# Patient Record
Sex: FEMALE | Race: WHITE | Employment: OTHER | URBAN - METROPOLITAN AREA
[De-identification: names, ages, dates, MRNs, and addresses within clinical notes are randomized per-mention and may not be internally consistent; named-entity substitution may affect disease eponyms.]

---

## 2017-07-03 ENCOUNTER — OFFICE VISIT (OUTPATIENT)
Dept: NEUROLOGY | Age: 67
End: 2017-07-03

## 2017-07-03 VITALS
SYSTOLIC BLOOD PRESSURE: 150 MMHG | HEIGHT: 65 IN | BODY MASS INDEX: 26.82 KG/M2 | DIASTOLIC BLOOD PRESSURE: 70 MMHG | WEIGHT: 161 LBS | RESPIRATION RATE: 20 BRPM

## 2017-07-03 DIAGNOSIS — R51.9 MIXED HEADACHE: Primary | ICD-10-CM

## 2017-07-03 RX ORDER — HYDROCODONE BITARTRATE AND ACETAMINOPHEN 7.5; 325 MG/1; MG/1
TABLET ORAL
Refills: 0 | COMMUNITY
Start: 2017-06-14 | End: 2018-09-28 | Stop reason: DRUGHIGH

## 2017-07-03 RX ORDER — ATORVASTATIN CALCIUM 10 MG/1
TABLET, FILM COATED ORAL
Refills: 3 | COMMUNITY
Start: 2017-03-31 | End: 2018-09-28 | Stop reason: ALTCHOICE

## 2017-07-03 NOTE — MR AVS SNAPSHOT
Visit Information Date & Time Provider Department Dept. Phone Encounter #  
 7/3/2017  3:40 PM Cindy Paige MD [facility] [phone] [id number] Your Appointments 7/2/2018  3:20 PM  
Follow Up with Cindy Paige MD  
[facility] [place]) Appt Note: headache [place] [address] [facility] 99 [id number] [phone]  
  
   
 [place] [address] [id number] [address] Upcoming Health Maintenance Date Due Hepatitis C Screening 1950 DTaP/Tdap/Td series (1 - Tdap) 8/30/1970 FOBT Q 1 YEAR AGE 50-75 8/30/1999 ZOSTER VACCINE AGE 60> 8/30/2009 BREAST CANCER SCRN MAMMOGRAM 2/10/2014 GLAUCOMA SCREENING Q2Y 8/30/2014 OSTEOPOROSIS SCREENING (DEXA) 8/30/2014 Pneumococcal 65+ Low/Medium Risk (1 of 2 - PCV13) 8/30/2014 MEDICARE YEARLY EXAM 8/30/2014 INFLUENZA AGE 9 TO ADULT 8/1/2017 Allergies as of 7/3/2017  Review Complete On: 5/3/2016 By: Natividad Barth MD  
  
 Severity Noted Reaction Type Reactions Hand  [Ethyl Alcohol (Skin Cleanser)]  11/03/2010    Not Reported This Time Current Immunizations  Never Reviewed No immunizations on file. Not reviewed this visit Vitals BP Resp Height(growth percentile) Weight(growth percentile) BMI OB Status 150/70 20 5' 5\" (1.651 m) 161 lb (73 kg) 26.79 kg/m2 Postmenopausal  
 Smoking Status Former Smoker Vitals History BMI and BSA Data Body Mass Index Body Surface Area  
 26.79 kg/m 2 1.83 m 2 Preferred Pharmacy Pharmacy Name Phone [facility] [address] [place] [address] [phone] Your Updated Medication List  
  
   
This list is accurate as of: 7/3/17  4:04 PM.  Always use your most recent med list. amLODIPine-benazepril 5-10 mg per capsule Commonly known as:  Mio Sill Take 1 Cap by mouth daily. atorvastatin 10 mg tablet Commonly known as:  LIPITOR  
TAKE 1 TABLET BY MOUTH ONCE A DAY  
  
 dicyclomine 20 mg tablet Commonly known as:  BENTYL  
  
 ergocalciferol 50,000 unit capsule Commonly known as:  ERGOCALCIFEROL Take 50,000 Units by mouth.  
  
 * HYDROcodone-acetaminophen 7.5-750 mg per tablet Commonly known as:  VICODIN ES Take  by mouth every six (6) hours as needed for Pain. * HYDROcodone-acetaminophen 7.5-325 mg per tablet Commonly known as:  NORCO  
TAKE 1 OR 2 TABLETS BY MOUTH 4 TIMES A DAY AS NEEDED JUBLIA Dorinda topical solution Generic drug:  efinaconazole  
  
 lamoTRIgine 150 mg tablet Commonly known as: LaMICtal  
Take 300 mg by mouth daily. metoprolol succinate 50 mg XL tablet Commonly known as:  TOPROL-XL  
daily. omeprazole 20 mg capsule Commonly known as:  PRILOSEC OxyCONTIN 30 mg Tr12 Generic drug:  oxyCODONE  
  
 PROLIA 60 mg/mL injection Generic drug:  denosumab 60 mg by SubCUTAneous route. QUEtiapine 200 mg tablet Commonly known as:  SEROquel Take 400 mg by mouth nightly. topiramate 200 mg tablet Commonly known as:  TOPAMAX TAKE ONE TABLET BY MOUTH EVERY DAY  
  
 traZODone 50 mg tablet Commonly known as:  Audie Dart Take  by mouth nightly. X-VIATE 40 % topical cream  
Generic drug:  urea * Notice: This list has 2 medication(s) that are the same as other medications prescribed for you. Read the directions carefully, and ask your doctor or other care provider to review them with you. Patient Instructions Maxine Noonan 1721 What is a living will? A living will is a legal form you use to write down the kind of care you want at the end of your life. It is used by the health professionals who will treat you if you aren't able to decide for yourself. If you put your wishes in writing, your loved ones and others will know what kind of care you want. They won't need to guess. This can ease your mind and be helpful to others. A living will is not the same as an estate or property will. An estate will explains what you want to happen with your money and property after you die. Is a living will a legal document? A living will is a legal document. Each state has its own laws about living shay. If you move to another state, make sure that your living will is legal in the state where you now live. Or you might use a universal form that has been approved by many states. This kind of form can sometimes be completed and stored online. Your electronic copy will then be available wherever you have a connection to the Internet. In most cases, doctors will respect your wishes even if you have a form from a different state. · You don't need an  to complete a living will. But legal advice can be helpful if your state's laws are unclear, your health history is complicated, or your family can't agree on what should be in your living will. · You can change your living will at any time. Some people find that their wishes about end-of-life care change as their health changes. · In addition to making a living will, think about completing a medical power of  form. This form lets you name the person you want to make end-of-life treatment decisions for you (your \"health care agent\") if you're not able to. Many hospitals and nursing homes will give you the forms you need to complete a living will and a medical power of . · Your living will is used only if you can't make or communicate decisions for yourself anymore. If you become able to make decisions again, you can accept or refuse any treatment, no matter what you wrote in your living will. · Your state may offer an online registry.  This is a place where you can store your living will online so the doctors and nurses who need to treat you can find it right away. What should you think about when creating a living will? Talk about your end-of-life wishes with your family members and your doctor. Let them know what you want. That way the people making decisions for you won't be surprised by your choices. Think about these questions as you make your living will: · Do you know enough about life support methods that might be used? If not, talk to your doctor so you know what might be done if you can't breathe on your own, your heart stops, or you're unable to swallow. · What things would you still want to be able to do after you receive life-support methods? Would you want to be able to walk? To speak? To eat on your own? To live without the help of machines? · If you have a choice, where do you want to be cared for? In your home? At a hospital or nursing home? · Do you want certain Methodist practices performed if you become very ill? · If you have a choice at the end of your life, where would you prefer to die? At home? In a hospital or nursing home? Somewhere else? · Would you prefer to be buried or cremated? · Do you want your organs to be donated after you die? What should you do with your living will? · Make sure that your family members and your health care agent have copies of your living will. · Give your doctor a copy of your living will to keep in your medical record. If you have more than one doctor, make sure that each one has a copy. · You may want to put a copy of your living will where it can be easily found. Where can you learn more? Go to http://filemon-alex.info/. Enter X429 in the search box to learn more about \"Learning About Living Christina Topete. \" Current as of: August 8, 2016 Content Version: 11.3 © 0047-9904 Bound[employer], [employer].  Care instructions adapted under license by [address] (which disclaims liability or warranty for this information). If you have questions about a medical condition or this instruction, always ask your healthcare professional. [address] any warranty or liability for your use of this information. Advance Directives: Care Instructions Your Care Instructions An advance directive is a legal way to state your wishes at the end of your life. It tells your family and your doctor what to do if you can no longer say what you want. There are two main types of advance directives. You can change them any time that your wishes change. · A living will tells your family and your doctor your wishes about life support and other treatment. · A durable power of  for health care lets you name a person to make treatment decisions for you when you can't speak for yourself. This person is called a health care agent. If you do not have an advance directive, decisions about your medical care may be made by a doctor or a  who doesn't know you. It may help to think of an advance directive as a gift to the people who care for you. If you have one, they won't have to make tough decisions by themselves. Follow-up care is a key part of your treatment and safety. Be sure to make and go to all appointments, and call your doctor if you are having problems. It's also a good idea to know your test results and keep a list of the medicines you take. How can you care for yourself at home? · Discuss your wishes with your loved ones and your doctor. This way, there are no surprises. · Many states have a unique form. Or you might use a universal form that has been approved by many states. This kind of form can sometimes be completed and stored online. Your electronic copy will then be available wherever you have a connection to the Internet.  In most cases, doctors will respect your wishes even if you have a form from a different state. · You don't need a  to do an advance directive. But you may want to get legal advice. · Think about these questions when you prepare an advance directive: ¨ Who do you want to make decisions about your medical care if you are not able to? Many people choose a family member or close friend. ¨ Do you know enough about life support methods that might be used? If not, talk to your doctor so you understand. ¨ What are you most afraid of that might happen? You might be afraid of having pain, losing your independence, or being kept alive by machines. ¨ Where would you prefer to die? Choices include your home, a hospital, or a nursing home. ¨ Would you like to have information about hospice care to support you and your family? ¨ Do you want to donate organs when you die? ¨ Do you want certain Shinto practices performed before you die? If so, put your wishes in the advance directive. · Read your advance directive every year, and make changes as needed. When should you call for help? Be sure to contact your doctor if you have any questions. Where can you learn more? Go to http://filemon-alex.info/. Enter R264 in the search box to learn more about \"Advance Directives: Care Instructions. \" Current as of: November 17, 2016 Content Version: 11.3 © 8303-7947 [employer], Inc[employer]. Care instructions adapted under license by [employer] (which disclaims liability or warranty for this information). If you have questions about a medical condition or this instruction, always ask your healthcare professional. [place] [address] any warranty or liability for your use of this information. PRESCRIPTION REFILL POLICY [facility] Statement to Patients April 1, 2014 In an effort to ensure the large volume of patient prescription refills is processed in the most efficient and expeditious manner, we are asking our patients to assist us by calling your Pharmacy for all prescription refills, this will include also your  Mail Order Pharmacy. The pharmacy will contact our office electronically to continue the refill process. Please do not wait until the last minute to call your pharmacy. We need at least 48 hours (2days) to fill prescriptions. We also encourage you to call your pharmacy before going to  your prescription to make sure it is ready. With regard to controlled substance prescription refill requests (narcotic refills) that need to be picked up at our office, we ask your cooperation by providing us with at least 72 hours (3days) notice that you will need a refill. We will not refill narcotic prescription refill requests after 4:00pm on any weekday, Monday through Thursday, or after 2:00pm on Fridays, or on the weekends. We encourage everyone to explore another way of getting your prescription refill request processed using Apptive, our patient web portal through our electronic medical record system. Apptive is an efficient and effective way to communicate your medication request directly to the office and  downloadable as an hong on your smart phone . Apptive also features a review functionality that allows you to view your medication list as well as leave messages for your physician. Are you ready to get connected? If so please review the attatched instructions or speak to any of our staff to get you set up right away! Thank you so much for your cooperation. Should you have any questions please contact our Practice Administrator. The Physicians and Staff,  [facility] Introducing [facility] SERVICES! Dear Mateus Lopez: 
Thank you for requesting a Apptive account. Our records indicate that you already have an active Apptive account.   You can access your account anytime at [email or web address] Did you know that you can access your hospital and ER discharge instructions at any time in NOTIK? You can also review all of your test results from your hospital stay or ER visit. Additional Information If you have questions, please visit the Frequently Asked Questions section of the NOTIK website at [email or web address] Remember, NOTIK is NOT to be used for urgent needs. For medical emergencies, dial 911. Now available from your iPhone and Android! Please provide this summary of care documentation to your next provider. Your primary care clinician is listed as Kg Harman. If you have any questions after today's visit, please call [phone].

## 2017-07-03 NOTE — PATIENT INSTRUCTIONS
Learning About Carmine Alejandre  What is a living will? A living will is a legal form you use to write down the kind of care you want at the end of your life. It is used by the health professionals who will treat you if you aren't able to decide for yourself. If you put your wishes in writing, your loved ones and others will know what kind of care you want. They won't need to guess. This can ease your mind and be helpful to others. A living will is not the same as an estate or property will. An estate will explains what you want to happen with your money and property after you die. Is a living will a legal document? A living will is a legal document. Each state has its own laws about living shay. If you move to another state, make sure that your living will is legal in the state where you now live. Or you might use a universal form that has been approved by many states. This kind of form can sometimes be completed and stored online. Your electronic copy will then be available wherever you have a connection to the Internet. In most cases, doctors will respect your wishes even if you have a form from a different state. · You don't need an  to complete a living will. But legal advice can be helpful if your state's laws are unclear, your health history is complicated, or your family can't agree on what should be in your living will. · You can change your living will at any time. Some people find that their wishes about end-of-life care change as their health changes. · In addition to making a living will, think about completing a medical power of  form. This form lets you name the person you want to make end-of-life treatment decisions for you (your \"health care agent\") if you're not able to. Many hospitals and nursing homes will give you the forms you need to complete a living will and a medical power of .   · Your living will is used only if you can't make or communicate decisions for yourself anymore. If you become able to make decisions again, you can accept or refuse any treatment, no matter what you wrote in your living will. · Your state may offer an online registry. This is a place where you can store your living will online so the doctors and nurses who need to treat you can find it right away. What should you think about when creating a living will? Talk about your end-of-life wishes with your family members and your doctor. Let them know what you want. That way the people making decisions for you won't be surprised by your choices. Think about these questions as you make your living will:  · Do you know enough about life support methods that might be used? If not, talk to your doctor so you know what might be done if you can't breathe on your own, your heart stops, or you're unable to swallow. · What things would you still want to be able to do after you receive life-support methods? Would you want to be able to walk? To speak? To eat on your own? To live without the help of machines? · If you have a choice, where do you want to be cared for? In your home? At a hospital or nursing home? · Do you want certain Religion practices performed if you become very ill? · If you have a choice at the end of your life, where would you prefer to die? At home? In a hospital or nursing home? Somewhere else? · Would you prefer to be buried or cremated? · Do you want your organs to be donated after you die? What should you do with your living will? · Make sure that your family members and your health care agent have copies of your living will. · Give your doctor a copy of your living will to keep in your medical record. If you have more than one doctor, make sure that each one has a copy. · You may want to put a copy of your living will where it can be easily found. Where can you learn more? Go to http://filemon-alex.info/.   Enter M851 in the search box to learn more about \"Learning About Living Deann. \"  Current as of: August 8, 2016  Content Version: 11.3  © 0388-0613 RiverOne. Care instructions adapted under license by [employer] (which disclaims liability or warranty for this information). If you have questions about a medical condition or this instruction, always ask your healthcare professional. [address] any warranty or liability for your use of this information. Advance Directives: Care Instructions  Your Care Instructions  An advance directive is a legal way to state your wishes at the end of your life. It tells your family and your doctor what to do if you can no longer say what you want. There are two main types of advance directives. You can change them any time that your wishes change. · A living will tells your family and your doctor your wishes about life support and other treatment. · A durable power of  for health care lets you name a person to make treatment decisions for you when you can't speak for yourself. This person is called a health care agent. If you do not have an advance directive, decisions about your medical care may be made by a doctor or a  who doesn't know you. It may help to think of an advance directive as a gift to the people who care for you. If you have one, they won't have to make tough decisions by themselves. Follow-up care is a key part of your treatment and safety. Be sure to make and go to all appointments, and call your doctor if you are having problems. It's also a good idea to know your test results and keep a list of the medicines you take. How can you care for yourself at home? · Discuss your wishes with your loved ones and your doctor. This way, there are no surprises. · Many states have a unique form. Or you might use a universal form that has been approved by many states. This kind of form can sometimes be completed and stored online.  Your electronic copy will then be available wherever you have a connection to the Internet. In most cases, doctors will respect your wishes even if you have a form from a different state. · You don't need a  to do an advance directive. But you may want to get legal advice. · Think about these questions when you prepare an advance directive:  ¨ Who do you want to make decisions about your medical care if you are not able to? Many people choose a family member or close friend. ¨ Do you know enough about life support methods that might be used? If not, talk to your doctor so you understand. ¨ What are you most afraid of that might happen? You might be afraid of having pain, losing your independence, or being kept alive by machines. ¨ Where would you prefer to die? Choices include your home, a hospital, or a nursing home. ¨ Would you like to have information about hospice care to support you and your family? ¨ Do you want to donate organs when you die? ¨ Do you want certain Sabianist practices performed before you die? If so, put your wishes in the advance directive. · Read your advance directive every year, and make changes as needed. When should you call for help? Be sure to contact your doctor if you have any questions. Where can you learn more? Go to http://filemon-alex.info/. Enter R264 in the search box to learn more about \"Advance Directives: Care Instructions. \"  Current as of: November 17, 2016  Content Version: 11.3  © 0347-6420 HouseTab. Care instructions adapted under license by [employer] (which disclaims liability or warranty for this information). If you have questions about a medical condition or this instruction, always ask your healthcare professional. [place] [address] any warranty or liability for your use of this information.   [address] [facility]   Statement to Patients  April 1, 2014      In an effort to ensure the large volume of patient prescription refills is processed in the most efficient and expeditious manner, we are asking our patients to assist us by calling your Pharmacy for all prescription refills, this will include also your  Mail Order Pharmacy. The pharmacy will contact our office electronically to continue the refill process. Please do not wait until the last minute to call your pharmacy. We need at least 48 hours (2days) to fill prescriptions. We also encourage you to call your pharmacy before going to  your prescription to make sure it is ready. With regard to controlled substance prescription refill requests (narcotic refills) that need to be picked up at our office, we ask your cooperation by providing us with at least 72 hours (3days) notice that you will need a refill. We will not refill narcotic prescription refill requests after 4:00pm on any weekday, Monday through Thursday, or after 2:00pm on Fridays, or on the weekends. We encourage everyone to explore another way of getting your prescription refill request processed using [employer], our patient web portal through our electronic medical record system. [employer] is an efficient and effective way to communicate your medication request directly to the office and  downloadable as an hong on your smart phone . [employer] also features a review functionality that allows you to view your medication list as well as leave messages for your physician. Are you ready to get connected? If so please review the attatched instructions or speak to any of our staff to get you set up right away! Thank you so much for your cooperation. Should you have any questions please contact our Practice Administrator. The Physicians and Staff,  [facility] [[email or web address]] [facility]     Patient is doing reasonably well recommended continuation of Topamax as a preventative.   My best advice is to stay as reasonably busy as possible and stay safe.

## 2017-08-01 RX ORDER — TOPIRAMATE 200 MG/1
TABLET ORAL
Qty: 90 TAB | Refills: 3 | Status: SHIPPED | OUTPATIENT
Start: 2017-08-01 | End: 2018-07-26 | Stop reason: SDUPTHER

## 2018-09-06 RX ORDER — TOPIRAMATE 200 MG/1
TABLET ORAL
Qty: 30 TAB | Refills: 0 | OUTPATIENT
Start: 2018-09-06

## 2018-09-06 NOTE — TELEPHONE ENCOUNTER
[facility] pharmacy called to check the status of topiramate refill. Informed her per provider no more refills until office visit. She states understanding and will let patient know.

## 2018-09-10 ENCOUNTER — TELEPHONE (OUTPATIENT)
Dept: NEUROLOGY | Age: 68
End: 2018-09-10

## 2018-09-10 RX ORDER — TOPIRAMATE 200 MG/1
TABLET ORAL
Qty: 30 TAB | Refills: 0 | Status: SHIPPED | OUTPATIENT
Start: 2018-09-10 | End: 2018-09-28 | Stop reason: SDUPTHER

## 2018-09-10 NOTE — TELEPHONE ENCOUNTER
----- Message from Kimberley Rivera sent at 9/10/2018 12:19 PM EDT -----  Regarding: Dr. Dann Martin  Pt requested a call back to schedule a sooner f/u appt if poss (out of Topamax for 3 days). Best contact [phone].

## 2018-09-28 ENCOUNTER — OFFICE VISIT (OUTPATIENT)
Dept: NEUROLOGY | Age: 68
End: 2018-09-28

## 2018-09-28 VITALS
DIASTOLIC BLOOD PRESSURE: 66 MMHG | SYSTOLIC BLOOD PRESSURE: 114 MMHG | HEART RATE: 73 BPM | BODY MASS INDEX: 28.22 KG/M2 | HEIGHT: 65 IN | OXYGEN SATURATION: 97 % | WEIGHT: 169.4 LBS | RESPIRATION RATE: 18 BRPM

## 2018-09-28 DIAGNOSIS — G43.009 MIGRAINE WITHOUT AURA AND WITHOUT STATUS MIGRAINOSUS, NOT INTRACTABLE: Primary | ICD-10-CM

## 2018-09-28 RX ORDER — HYDROCODONE BITARTRATE AND ACETAMINOPHEN 10; 325 MG/1; MG/1
2 TABLET ORAL
COMMUNITY
End: 2019-07-09

## 2018-09-28 RX ORDER — TOPIRAMATE 200 MG/1
TABLET ORAL
Qty: 90 TAB | Refills: 3 | Status: SHIPPED | OUTPATIENT
Start: 2018-09-28 | End: 2019-07-09

## 2018-09-28 NOTE — PATIENT INSTRUCTIONS
10 [facility]   Statement to Patients  April 1, 2014      In an effort to ensure the large volume of patient prescription refills is processed in the most efficient and expeditious manner, we are asking our patients to assist us by calling your Pharmacy for all prescription refills, this will include also your  Mail Order Pharmacy. The pharmacy will contact our office electronically to continue the refill process. Please do not wait until the last minute to call your pharmacy. We need at least 48 hours (2days) to fill prescriptions. We also encourage you to call your pharmacy before going to  your prescription to make sure it is ready. With regard to controlled substance prescription refill requests (narcotic refills) that need to be picked up at our office, we ask your cooperation by providing us with at least 72 hours (3days) notice that you will need a refill. We will not refill narcotic prescription refill requests after 4:00pm on any weekday, Monday through Thursday, or after 2:00pm on Fridays, or on the weekends. We encourage everyone to explore another way of getting your prescription refill request processed using Taggable, our patient web portal through our electronic medical record system. Taggable is an efficient and effective way to communicate your medication request directly to the office and  downloadable as an hong on your smart phone . Taggable also features a review functionality that allows you to view your medication list as well as leave messages for your physician. Are you ready to get connected? If so please review the attatched instructions or speak to any of our staff to get you set up right away! Thank you so much for your cooperation. Should you have any questions please contact our Practice Administrator.     The Physicians and Staff,  [address] [place] [address] Neurology Clinic                A Healthy Lifestyle: Care Instructions  Your Care Instructions    A healthy lifestyle can help you feel good, stay at a healthy weight, and have plenty of energy for both work and play. A healthy lifestyle is something you can share with your whole family. A healthy lifestyle also can lower your risk for serious health problems, such as high blood pressure, heart disease, and diabetes. You can follow a few steps listed below to improve your health and the health of your family. Follow-up care is a key part of your treatment and safety. Be sure to make and go to all appointments, and call your doctor if you are having problems. It's also a good idea to know your test results and keep a list of the medicines you take. How can you care for yourself at home? · Do not eat too much sugar, fat, or fast foods. You can still have dessert and treats now and then. The goal is moderation. · Start small to improve your eating habits. Pay attention to portion sizes, drink less juice and soda pop, and eat more fruits and vegetables. ¨ Eat a healthy amount of food. A 3-ounce serving of meat, for example, is about the size of a deck of cards. Fill the rest of your plate with vegetables and whole grains. ¨ Limit the amount of soda and sports drinks you have every day. Drink more water when you are thirsty. ¨ Eat at least 5 servings of fruits and vegetables every day. It may seem like a lot, but it is not hard to reach this goal. A serving or helping is 1 piece of fruit, 1 cup of vegetables, or 2 cups of leafy, raw vegetables. Have an apple or some carrot sticks as an afternoon snack instead of a candy bar. Try to have fruits and/or vegetables at every meal.  · Make exercise part of your daily routine. You may want to start with simple activities, such as walking, bicycling, or slow swimming. Try to be active 30 to 60 minutes every day. You do not need to do all 30 to 60 minutes all at once. For example, you can exercise 3 times a day for 10 or 20 minutes.  Moderate exercise is safe for most people, but it is always a good idea to talk to your doctor before starting an exercise program.  · Keep moving. Umang Lisa the lawn, work in the garden, or [employer]. Take the stairs instead of the elevator at work. · If you smoke, quit. People who smoke have an increased risk for heart attack, stroke, cancer, and other lung illnesses. Quitting is hard, but there are ways to boost your chance of quitting tobacco for good. ¨ Use nicotine gum, patches, or lozenges. ¨ Ask your doctor about stop-smoking programs and medicines. ¨ Keep trying. In addition to reducing your risk of diseases in the future, you will notice some benefits soon after you stop using tobacco. If you have shortness of breath or asthma symptoms, they will likely get better within a few weeks after you quit. · Limit how much alcohol you drink. Moderate amounts of alcohol (up to 2 drinks a day for men, 1 drink a day for women) are okay. But drinking too much can lead to liver problems, high blood pressure, and other health problems. Family health  If you have a family, there are many things you can do together to improve your health. · Eat meals together as a family as often as possible. · Eat healthy foods. This includes fruits, vegetables, lean meats and dairy, and whole grains. · Include your family in your fitness plan. Most people think of activities such as jogging or tennis as the way to fitness, but there are many ways you and your family can be more active. Anything that makes you breathe hard and gets your heart pumping is exercise. Here are some tips:  ¨ Walk to do errands or to take your child to school or the bus. ¨ Go for a family bike ride after dinner instead of watching TV. Where can you learn more? Go to http://filemon-alex.info/. Enter G041 in the search box to learn more about \"A Healthy Lifestyle: Care Instructions. \"  Current as of: December 7, 2017  Content Version: 11.7  © 5324-7624 Healthwise, Incorporated. Care instructions adapted under license by [employer] (which disclaims liability or warranty for this information). If you have questions about a medical condition or this instruction, always ask your healthcare professional. [address] any warranty or liability for your use of this information. Patient history reviewed and patient examined. Will renew the Topamax by request and see her back in 1 year. Hopefully all the family issues will settle and she will be better for it. Good luck.

## 2018-09-28 NOTE — PROGRESS NOTES
Neurology Consult      Subjective:      Joann Rodriguez is a 70 y.o. female who comes in today on her annual revisit. Has chronic migraine headaches and Topamax 200 mg daily does seem to help. Will renew by request and suggest revisit in 1 year. Once again has lots of family trauma unfortunately it unfolds around her and no doubt helps contribute to her headache situation. Does not mention any new issues herself and her exam looks very much baseline. Has a great attitude and seems to be there for everyone that needs a .will suggest her usual and customary revisit in 1 year. Current Outpatient Prescriptions   Medication Sig Dispense Refill    HYDROcodone-acetaminophen (NORCO)  mg tablet Take 2 Tabs by mouth five (5) times daily.  topiramate (TOPAMAX) 200 mg tablet TAKE 1 TABLET BY MOUTH EVERY DAY 90 Tab 3    dicyclomine (BENTYL) 20 mg tablet   1    lamoTRIgine (LAMICTAL) 150 mg tablet Take 300 mg by mouth daily. 1    metoprolol succinate (TOPROL-XL) 50 mg XL tablet daily. 0    omeprazole (PRILOSEC) 20 mg capsule   6    QUEtiapine (SEROQUEL) 200 mg tablet Take 400 mg by mouth nightly.  ergocalciferol (ERGOCALCIFEROL) 50,000 unit capsule Take 50,000 Units by mouth.  amLODIPine-benazepril (LOTREL) 5-10 mg per capsule Take 1 Cap by mouth daily.           Allergies   Allergen Reactions    Hand  [Ethyl Alcohol (Skin Cleanser)] Not Reported This Time     Past Medical History:   Diagnosis Date    Bipolar affective ([facility]     Chronic kidney disease     Hypertension     Ischemic colitis ([facility] 2008    Lithium toxicity 2004    Stroke [facility]) 2004      Past Surgical History:   Procedure Laterality Date    HX ORTHOPAEDIC      back surgery 2007    HX ORTHOPAEDIC      right foot surgery 2010      Social History     Social History    Marital status:      Spouse name: N/A    Number of children: N/A    Years of education: N/A     Occupational History    Not on file.     Social History Main Topics    Smoking status: Former Smoker    Smokeless tobacco: Never Used    Alcohol use No    Drug use: No    Sexual activity: Yes     Partners: Male     Other Topics Concern    Not on file     Social History Narrative      Family History   Problem Relation Age of Onset    Lung Disease Mother     Cancer Mother      Brain      Visit Vitals    /66    Pulse 73    Resp 18    Ht 5' 5\" (1.651 m)    Wt 76.8 kg (169 lb 6.4 oz)    SpO2 97%    BMI 28.19 kg/m2        Review of Systems:   A comprehensive review of systems was negative except for that written in the HPI. Neuro Exam:     Appearance: The patient is well developed, well nourished, provides a coherent history and is in no acute distress. Mental Status: Oriented to time, place and person. Mood and affect appropriate. Cranial Nerves:   Intact visual fields. Fundi are benign. GUILLE, EOM's full, no nystagmus, no ptosis. Facial sensation is normal. Corneal reflexes are intact. Facial movement is symmetric. Hearing is normal bilaterally. Palate is midline with normal sternocleidomastoid and trapezius muscles are normal. Tongue is midline. Motor:  5/5 strength in upper and lower proximal and distal muscles. Normal bulk and tone. No fasciculations. Reflexes:   Deep tendon reflexes 2+/4 and symmetrical.   Sensory:   Normal to touch, pinprick and vibration. Gait:  Normal gait. Tremor:   No tremor noted. Cerebellar:  No cerebellar signs present. Neurovascular:  Normal heart sounds and regular rhythm, peripheral pulses intact, and no carotid bruits. Assessment:   Migraine headaches. Unfortunately continues to have a lot of family drama of which she is not in control. Will renew the Topamax by request and suggest revisit in 1 year. Plan:   Revisit 1 year.   Signed by :  Pierre Stevenson MD

## 2018-09-28 NOTE — MR AVS SNAPSHOT
[address] [facility] [address] [address] [phone] Patient: Diego Bishop MRN: [id number] Visit Information Date & Time Provider Department Dept. Phone Encounter #  
 9/28/2018  3:20 PM Laxmi Anderson MD [facility] [phone] [id number] Follow-up Instructions Return in about 1 year (around 9/28/2019). Follow-up and Disposition History Upcoming Health Maintenance Date Due Hepatitis C Screening 1950 DTaP/Tdap/Td series (1 - Tdap) 8/30/1970 Shingrix Vaccine Age 50> (1 of 2) 8/30/1999 FOBT Q 1 YEAR AGE 50-75 8/30/1999 BREAST CANCER SCRN MAMMOGRAM 2/10/2014 GLAUCOMA SCREENING Q2Y 8/30/2014 Bone Densitometry (Dexa) Screening 8/30/2014 Pneumococcal 65+ Low/Medium Risk (1 of 2 - PCV13) 8/30/2014 Influenza Age 5 to Adult 8/1/2018 Allergies as of 9/28/2018  Review Complete On: 9/28/2018 By: Laxmi Anderson MD  
  
 Severity Noted Reaction Type Reactions Hand  [Ethyl Alcohol (Skin Cleanser)]  11/03/2010    Not Reported This Time Current Immunizations  Never Reviewed No immunizations on file. Not reviewed this visit You Were Diagnosed With   
  
 Codes Comments Migraine without aura and without status migrainosus, not intractable    -  Primary ICD-10-CM: G43.009 ICD-9-CM: 346.10 Vitals BP Pulse Resp Height(growth percentile) Weight(growth percentile) SpO2  
 114/66 73 18 5' 5\" (1.651 m) 169 lb 6.4 oz (76.8 kg) 97% BMI OB Status Smoking Status 28.19 kg/m2 Postmenopausal Former Smoker Vitals History BMI and BSA Data Body Mass Index Body Surface Area  
 28.19 kg/m 2 1.88 m 2 Preferred Pharmacy Pharmacy Name Phone [facility] [address] [phone] Your Updated Medication List  
  
   
 This list is accurate as of 9/28/18  4:05 PM.  Always use your most recent med list. amLODIPine-benazepril 5-10 mg per capsule Commonly known as:  Ashly Ba Take 1 Cap by mouth daily. dicyclomine 20 mg tablet Commonly known as:  BENTYL  
  
 ergocalciferol 50,000 unit capsule Commonly known as:  ERGOCALCIFEROL Take 50,000 Units by mouth. HYDROcodone-acetaminophen  mg tablet Commonly known as:  Iven Holman Take 2 Tabs by mouth five (5) times daily. lamoTRIgine 150 mg tablet Commonly known as: LaMICtal  
Take 300 mg by mouth daily. metoprolol succinate 50 mg XL tablet Commonly known as:  TOPROL-XL  
daily. omeprazole 20 mg capsule Commonly known as:  PRILOSEC QUEtiapine 200 mg tablet Commonly known as:  SEROquel Take 400 mg by mouth nightly. topiramate 200 mg tablet Commonly known as:  TOPAMAX TAKE 1 TABLET BY MOUTH EVERY DAY Prescriptions Sent to Pharmacy Refills  
 topiramate (TOPAMAX) 200 mg tablet 3 Sig: TAKE 1 TABLET BY MOUTH EVERY DAY Class: Normal  
 Pharmacy: [address] #: [phone] Follow-up Instructions Return in about 1 year (around 9/28/2019). Patient Instructions PRESCRIPTION REFILL POLICY [facility] Statement to Patients April 1, 2014 In an effort to ensure the large volume of patient prescription refills is processed in the most efficient and expeditious manner, we are asking our patients to assist us by calling your Pharmacy for all prescription refills, this will include also your  Mail Order Pharmacy. The pharmacy will contact our office electronically to continue the refill process. Please do not wait until the last minute to call your pharmacy. We need at least 48 hours (2days) to fill prescriptions.  We also encourage you to call your pharmacy before going to  your prescription to make sure it is ready. With regard to controlled substance prescription refill requests (narcotic refills) that need to be picked up at our office, we ask your cooperation by providing us with at least 72 hours (3days) notice that you will need a refill. We will not refill narcotic prescription refill requests after 4:00pm on any weekday, Monday through Thursday, or after 2:00pm on Fridays, or on the weekends. We encourage everyone to explore another way of getting your prescription refill request processed using Tagkast, our patient web portal through our electronic medical record system. Tagkast is an efficient and effective way to communicate your medication request directly to the office and  downloadable as an hong on your smart phone . Tagkast also features a review functionality that allows you to view your medication list as well as leave messages for your physician. Are you ready to get connected? If so please review the attatched instructions or speak to any of our staff to get you set up right away! Thank you so much for your cooperation. Should you have any questions please contact our Practice Administrator. The Physicians and Staff,  [address] [place] [address] Neurology Clinic A Healthy Lifestyle: Care Instructions Your Care Instructions A healthy lifestyle can help you feel good, stay at a healthy weight, and have plenty of energy for both work and play. A healthy lifestyle is something you can share with your whole family. A healthy lifestyle also can lower your risk for serious health problems, such as high blood pressure, heart disease, and diabetes. You can follow a few steps listed below to improve your health and the health of your family. Follow-up care is a key part of your treatment and safety.  Be sure to make and go to all appointments, and call your doctor if you are having problems. It's also a good idea to know your test results and keep a list of the medicines you take. How can you care for yourself at home? · Do not eat too much sugar, fat, or fast foods. You can still have dessert and treats now and then. The goal is moderation. · Start small to improve your eating habits. Pay attention to portion sizes, drink less juice and soda pop, and eat more fruits and vegetables. ¨ Eat a healthy amount of food. A 3-ounce serving of meat, for example, is about the size of a deck of cards. Fill the rest of your plate with vegetables and whole grains. ¨ Limit the amount of soda and sports drinks you have every day. Drink more water when you are thirsty. ¨ Eat at least 5 servings of fruits and vegetables every day. It may seem like a lot, but it is not hard to reach this goal. A serving or helping is 1 piece of fruit, 1 cup of vegetables, or 2 cups of leafy, raw vegetables. Have an apple or some carrot sticks as an afternoon snack instead of a candy bar. Try to have fruits and/or vegetables at every meal. 
· Make exercise part of your daily routine. You may want to start with simple activities, such as walking, bicycling, or slow swimming. Try to be active 30 to 60 minutes every day. You do not need to do all 30 to 60 minutes all at once. For example, you can exercise 3 times a day for 10 or 20 minutes. Moderate exercise is safe for most people, but it is always a good idea to talk to your doctor before starting an exercise program. 
· Keep moving. Kg Acevedo the lawn, work in the garden, or [employer]. Take the stairs instead of the elevator at work. · If you smoke, quit. People who smoke have an increased risk for heart attack, stroke, cancer, and other lung illnesses. Quitting is hard, but there are ways to boost your chance of quitting tobacco for good. ¨ Use nicotine gum, patches, or lozenges. ¨ Ask your doctor about stop-smoking programs and medicines. ¨ Keep trying. In addition to reducing your risk of diseases in the future, you will notice some benefits soon after you stop using tobacco. If you have shortness of breath or asthma symptoms, they will likely get better within a few weeks after you quit. · Limit how much alcohol you drink. Moderate amounts of alcohol (up to 2 drinks a day for men, 1 drink a day for women) are okay. But drinking too much can lead to liver problems, high blood pressure, and other health problems. Family health If you have a family, there are many things you can do together to improve your health. · Eat meals together as a family as often as possible. · Eat healthy foods. This includes fruits, vegetables, lean meats and dairy, and whole grains. · Include your family in your fitness plan. Most people think of activities such as jogging or tennis as the way to fitness, but there are many ways you and your family can be more active. Anything that makes you breathe hard and gets your heart pumping is exercise. Here are some tips: 
¨ Walk to do errands or to take your child to school or the bus. ¨ Go for a family bike ride after dinner instead of watching TV. Where can you learn more? Go to http://filemon-alex.info/. Enter F355 in the search box to learn more about \"A Healthy Lifestyle: Care Instructions. \" Current as of: December 7, 2017 Content Version: 11.7 © 3727-0850 Healthwise, Incorporated. Care instructions adapted under license by [employer] (which disclaims liability or warranty for this information). If you have questions about a medical condition or this instruction, always ask your healthcare professional. [place] [address] any warranty or liability for your use of this information. Patient history reviewed and patient examined. Will renew the Topamax by request and see her back in 1 year. Hopefully all the family issues will settle and she will be better for it. Good luck. Patient Instructions History Introducing [facility]! Dear Shalom Stage: 
Thank you for requesting a Thinglink account. Our records indicate that you already have an active Thinglink account. You can access your account anytime at [email or web address] Did you know that you can access your hospital and ER discharge instructions at any time in Thinglink? You can also review all of your test results from your hospital stay or ER visit. Additional Information If you have questions, please visit the Frequently Asked Questions section of the Thinglink website at [email or web address] Remember, Thinglink is NOT to be used for urgent needs. For medical emergencies, dial 911. Now available from your iPhone and Android! Please provide this summary of care documentation to your next provider. Your primary care clinician is listed as Tripp Will. If you have any questions after today's visit, please call [phone].

## 2019-01-18 ENCOUNTER — OFFICE VISIT (OUTPATIENT)
Dept: NEUROLOGY | Age: 69
End: 2019-01-18

## 2019-01-18 VITALS
HEIGHT: 65 IN | BODY MASS INDEX: 27.82 KG/M2 | SYSTOLIC BLOOD PRESSURE: 130 MMHG | RESPIRATION RATE: 18 BRPM | WEIGHT: 167 LBS | HEART RATE: 94 BPM | DIASTOLIC BLOOD PRESSURE: 78 MMHG | OXYGEN SATURATION: 98 %

## 2019-01-18 DIAGNOSIS — S00.83XA FACIAL CONTUSION, INITIAL ENCOUNTER: Primary | ICD-10-CM

## 2019-01-18 RX ORDER — GABAPENTIN 100 MG/1
CAPSULE ORAL
Qty: 90 CAP | Refills: 1 | Status: SHIPPED | OUTPATIENT
Start: 2019-01-18 | End: 2019-04-02 | Stop reason: SDUPTHER

## 2019-01-18 NOTE — PATIENT INSTRUCTIONS
Patient history reviewed and patient examined. Had a recent facial contusion and would like to follow-up with a head CT scan and prescribe gabapentin as needed for discomfort. Was warned it could cause sedation. We will schedule a 6-month visit which will be closer to an annual revisit date all things considered.

## 2019-01-18 NOTE — PROGRESS NOTES
Neurology Consult      Subjective:      Kelsey Gabriel is a 70 y.o. female who comes in with the following history. Said 1-1/2 weeks ago she was out at night at 8:00 rummaging through the [place] and as she stepped back and turned to her left she apparently tripped up on her feet and fell forward but was not in a position to break her fall. Ended up hitting her central face on the ground and was out and she does not recall how long that was. Came to and apparently asked for help and a neighbor showed up and suggested possible 911 call. Ended up not going to the ER or getting medical assistance. Said she had some black and blue discoloration on her central forehead and still retains pain. Normally sees me for a mixed headache picture that includes migraines as well. Sees me normally on annual basis and was technically last seen here in September. From me is on 200 mg a day of Topamax. Would have a follow-up visit in September of this year but will bridge the gap with a 6-month revisit from now. Will order a head CT on this visit respecting the new trauma history and hopefully we will not find any abnormal pathology. Current Outpatient Medications   Medication Sig Dispense Refill    gabapentin (NEURONTIN) 100 mg capsule 1 po tid prn only. .. 90 Cap 1    HYDROcodone-acetaminophen (NORCO)  mg tablet Take 2 Tabs by mouth five (5) times daily.  topiramate (TOPAMAX) 200 mg tablet TAKE 1 TABLET BY MOUTH EVERY DAY 90 Tab 3    dicyclomine (BENTYL) 20 mg tablet   1    lamoTRIgine (LAMICTAL) 150 mg tablet Take 300 mg by mouth daily. 1    metoprolol succinate (TOPROL-XL) 50 mg XL tablet daily. 0    omeprazole (PRILOSEC) 20 mg capsule   6    QUEtiapine (SEROQUEL) 200 mg tablet Take 400 mg by mouth nightly.  ergocalciferol (ERGOCALCIFEROL) 50,000 unit capsule Take 50,000 Units by mouth.  amLODIPine-benazepril (LOTREL) 5-10 mg per capsule Take 1 Cap by mouth daily.           Allergies Allergen Reactions    Hand  [Ethyl Alcohol (Skin Cleanser)] Not Reported This Time     Past Medical History:   Diagnosis Date    Bipolar affective ([facility] [address]     Chronic kidney disease     Hypertension     Ischemic colitis ([facility] [address] 2008    Lithium toxicity 2004    Stroke [facility]) 2004      Past Surgical History:   Procedure Laterality Date    HX ORTHOPAEDIC      back surgery 2007    HX ORTHOPAEDIC      right foot surgery 2010      Social History     Socioeconomic History    Marital status:      Spouse name: Not on file    Number of children: Not on file    Years of education: Not on file    Highest education level: Not on file   Social Needs    Financial resource strain: Not on file    Food insecurity - worry: Not on file    Food insecurity - inability: Not on file   [employer] needs - medical: Not on file   [employer] needs - non-medical: Not on file   Occupational History    Not on file   Tobacco Use    Smoking status: Former Smoker    Smokeless tobacco: Never Used   Substance and Sexual Activity    Alcohol use: No     Alcohol/week: 0.0 oz    Drug use: No    Sexual activity: Yes     Partners: Male   Other Topics Concern    Not on file   Social History Narrative    Not on file      Family History   Problem Relation Age of Onset    Lung Disease Mother     Cancer Mother         Brain      Visit Vitals  /78   Pulse 94   Resp 18   Ht 5' 5\" (1.651 m)   Wt 75.8 kg (167 lb)   SpO2 98%   BMI 27.79 kg/m²        Review of Systems:   A comprehensive review of systems was negative except for that written in the HPI. Neuro Exam:     Appearance: The patient is well developed, well nourished, provides a coherent history and is in no acute distress. Mental Status: Oriented to time, place and person. Mood and affect appropriate. Cranial Nerves:   Intact visual fields. Fundi are benign. GUILLE, EOM's full, no nystagmus, no ptosis.  Facial sensation is normal. Corneal reflexes are intact. Facial movement is symmetric. Hearing is normal bilaterally. Palate is midline with normal sternocleidomastoid and trapezius muscles are normal. Tongue is midline. Motor:  5/5 strength in upper and lower proximal and distal muscles. Normal bulk and tone. No fasciculations. Reflexes:   Deep tendon reflexes 2+/4 and symmetrical.   Sensory:   Normal to touch, pinprick and vibration. Gait:  Normal gait. Tremor:   No tremor noted. Cerebellar:  No cerebellar signs present. Neurovascular:  Normal heart sounds and regular rhythm, peripheral pulses intact, and no carotid bruits. Assessment:   Central facial contusion. Still retains pain and will do a head CT just to be on the safe side. Issue gabapentin 100 mg 3 times daily as needed and she was warned of possible sedation. Will suggest revisit in 6 months and that will put her closer to her normal and customary 1 year revisit timeframe for mixed headache picture. Plan:   Revisit 6 months.   Signed by :  Aaliyah Potts MD

## 2019-01-28 ENCOUNTER — HOSPITAL ENCOUNTER (OUTPATIENT)
Dept: CT IMAGING | Age: 69
Discharge: HOME | End: 2019-01-28
Attending: SPECIALIST
Payer: COMMERCIAL

## 2019-01-28 DIAGNOSIS — S00.83XA FACIAL CONTUSION, INITIAL ENCOUNTER: ICD-10-CM

## 2019-01-28 PROCEDURE — 70450 CT HEAD/BRAIN W/O DYE: CPT

## 2019-02-14 ENCOUNTER — TELEPHONE (OUTPATIENT)
Dept: NEUROLOGY | Age: 69
End: 2019-02-14

## 2019-02-14 NOTE — TELEPHONE ENCOUNTER
----- Message from Delorise Scriver sent at 2/14/2019  1:39 PM EST -----  Regarding: Dr Post/Telephone  Pt is following up on results of CT Scan done 01/18/19    Best contact #  [phone]

## 2019-02-19 ENCOUNTER — TELEPHONE (OUTPATIENT)
Dept: NEUROLOGY | Age: 69
End: 2019-02-19

## 2019-02-19 NOTE — TELEPHONE ENCOUNTER
----- Message from Popeye Prasad sent at 2/19/2019 12:32 PM EST -----  Regarding: Dr. Roni Lacey  Pt returning call regarding results. [phone].

## 2019-02-20 NOTE — TELEPHONE ENCOUNTER
----- Message from Marky Combs sent at 2/20/2019  2:49 PM EST -----  Regarding: Dr. Segura Mom  Contact: [phone]  Pt missed call from Brenda and would like a call back.

## 2019-04-02 RX ORDER — GABAPENTIN 100 MG/1
CAPSULE ORAL
Qty: 90 CAP | Refills: 1 | Status: SHIPPED | OUTPATIENT
Start: 2019-04-02 | End: 2019-06-06 | Stop reason: SDUPTHER

## 2019-06-06 RX ORDER — GABAPENTIN 100 MG/1
CAPSULE ORAL
Qty: 90 CAP | Refills: 1 | Status: SHIPPED | OUTPATIENT
Start: 2019-06-06 | End: 2019-07-12 | Stop reason: SDUPTHER

## 2019-07-09 ENCOUNTER — APPOINTMENT (OUTPATIENT)
Dept: CT IMAGING | Age: 69
End: 2019-07-09
Attending: EMERGENCY MEDICINE
Payer: COMMERCIAL

## 2019-07-09 ENCOUNTER — APPOINTMENT (OUTPATIENT)
Dept: GENERAL RADIOLOGY | Age: 69
End: 2019-07-09
Attending: EMERGENCY MEDICINE
Payer: COMMERCIAL

## 2019-07-09 ENCOUNTER — HOSPITAL ENCOUNTER (OUTPATIENT)
Age: 69
Setting detail: OBSERVATION
Discharge: HOME | End: 2019-07-10
Attending: EMERGENCY MEDICINE | Admitting: HOSPITALIST
Payer: COMMERCIAL

## 2019-07-09 DIAGNOSIS — R00.0 SINUS TACHYCARDIA: ICD-10-CM

## 2019-07-09 DIAGNOSIS — M79.10 MYALGIA: ICD-10-CM

## 2019-07-09 DIAGNOSIS — R41.82 ALTERED MENTAL STATUS, UNSPECIFIED ALTERED MENTAL STATUS TYPE: Primary | ICD-10-CM

## 2019-07-09 DIAGNOSIS — N28.9 RENAL INSUFFICIENCY: ICD-10-CM

## 2019-07-09 PROBLEM — G93.40 ACUTE ENCEPHALOPATHY: Status: ACTIVE | Noted: 2019-07-09

## 2019-07-09 PROBLEM — I10 HTN (HYPERTENSION): Status: ACTIVE | Noted: 2019-07-09

## 2019-07-09 PROBLEM — E87.1 HYPONATREMIA: Status: ACTIVE | Noted: 2019-07-09

## 2019-07-09 PROBLEM — G93.40 ENCEPHALOPATHY: Status: ACTIVE | Noted: 2019-07-09

## 2019-07-09 PROBLEM — N17.9 ARF (ACUTE RENAL FAILURE) (HCC): Status: ACTIVE | Noted: 2019-07-09

## 2019-07-09 LAB
ABO + RH BLD: NORMAL
ALBUMIN SERPL-MCNC: 3.8 G/DL (ref 3.5–5)
ALBUMIN/GLOB SERPL: 0.8 {RATIO} (ref 1.1–2.2)
ALP SERPL-CCNC: 89 U/L (ref 45–117)
ALT SERPL-CCNC: 18 U/L (ref 12–78)
AMMONIA PLAS-SCNC: 32 UMOL/L
AMPHET UR QL SCN: NEGATIVE
ANION GAP SERPL CALC-SCNC: 10 MMOL/L (ref 5–15)
APPEARANCE UR: ABNORMAL
AST SERPL-CCNC: 21 U/L (ref 15–37)
ATRIAL RATE: 112 BPM
BACTERIA URNS QL MICRO: NEGATIVE /HPF
BARBITURATES UR QL SCN: NEGATIVE
BASOPHILS # BLD: 0.1 K/UL (ref 0–0.1)
BASOPHILS NFR BLD: 1 % (ref 0–1)
BENZODIAZ UR QL: NEGATIVE
BILIRUB SERPL-MCNC: 0.5 MG/DL (ref 0.2–1)
BILIRUB UR QL CFM: ABNORMAL
BLOOD GROUP ANTIBODIES SERPL: NORMAL
BUN SERPL-MCNC: 17 MG/DL (ref 6–20)
BUN/CREAT SERPL: 10 (ref 12–20)
CALCIUM SERPL-MCNC: 9.8 MG/DL (ref 8.5–10.1)
CALCULATED P AXIS, ECG09: 52 DEGREES
CALCULATED R AXIS, ECG10: 18 DEGREES
CALCULATED T AXIS, ECG11: 18 DEGREES
CANNABINOIDS UR QL SCN: NEGATIVE
CHLORIDE SERPL-SCNC: 101 MMOL/L (ref 97–108)
CK SERPL-CCNC: 534 U/L (ref 26–192)
CO2 SERPL-SCNC: 21 MMOL/L (ref 21–32)
COCAINE UR QL SCN: NEGATIVE
COLOR UR: ABNORMAL
COMMENT, HOLDF: NORMAL
CREAT SERPL-MCNC: 1.65 MG/DL (ref 0.55–1.02)
DIAGNOSIS, 93000: NORMAL
DIFFERENTIAL METHOD BLD: ABNORMAL
DRUG SCRN COMMENT,DRGCM: ABNORMAL
EOSINOPHIL # BLD: 0.1 K/UL (ref 0–0.4)
EOSINOPHIL NFR BLD: 1 % (ref 0–7)
EPITH CASTS URNS QL MICRO: NORMAL /LPF
ERYTHROCYTE [DISTWIDTH] IN BLOOD BY AUTOMATED COUNT: 12.8 % (ref 11.5–14.5)
ETHANOL SERPL-MCNC: <10 MG/DL
GLOBULIN SER CALC-MCNC: 4.6 G/DL (ref 2–4)
GLUCOSE SERPL-MCNC: 116 MG/DL (ref 65–100)
GLUCOSE UR STRIP.AUTO-MCNC: NEGATIVE MG/DL
HCT VFR BLD AUTO: 31 % (ref 35–47)
HGB BLD-MCNC: 10 G/DL (ref 11.5–16)
HGB UR QL STRIP: ABNORMAL
IMM GRANULOCYTES # BLD AUTO: 0 K/UL (ref 0–0.04)
IMM GRANULOCYTES NFR BLD AUTO: 0 % (ref 0–0.5)
INR PPP: 1 (ref 0.9–1.1)
KETONES UR QL STRIP.AUTO: NEGATIVE MG/DL
LACTATE SERPL-SCNC: 0.9 MMOL/L (ref 0.4–2)
LEUKOCYTE ESTERASE UR QL STRIP.AUTO: ABNORMAL
LYMPHOCYTES # BLD: 2 K/UL (ref 0.8–3.5)
LYMPHOCYTES NFR BLD: 21 % (ref 12–49)
MCH RBC QN AUTO: 29.9 PG (ref 26–34)
MCHC RBC AUTO-ENTMCNC: 32.3 G/DL (ref 30–36.5)
MCV RBC AUTO: 92.8 FL (ref 80–99)
METHADONE UR QL: NEGATIVE
MONOCYTES # BLD: 1.3 K/UL (ref 0–1)
MONOCYTES NFR BLD: 13 % (ref 5–13)
NEUTS SEG # BLD: 6.3 K/UL (ref 1.8–8)
NEUTS SEG NFR BLD: 64 % (ref 32–75)
NITRITE UR QL STRIP.AUTO: NEGATIVE
NRBC # BLD: 0 K/UL (ref 0–0.01)
NRBC BLD-RTO: 0 PER 100 WBC
OPIATES UR QL: POSITIVE
P-R INTERVAL, ECG05: 194 MS
PCP UR QL: NEGATIVE
PH UR STRIP: 5.5 [PH] (ref 5–8)
PLATELET # BLD AUTO: 316 K/UL (ref 150–400)
PMV BLD AUTO: 9.7 FL (ref 8.9–12.9)
POTASSIUM SERPL-SCNC: 3.9 MMOL/L (ref 3.5–5.1)
PROT SERPL-MCNC: 8.4 G/DL (ref 6.4–8.2)
PROT UR STRIP-MCNC: ABNORMAL MG/DL
PROTHROMBIN TIME: 10.2 SEC (ref 9–11.1)
Q-T INTERVAL, ECG07: 310 MS
QRS DURATION, ECG06: 88 MS
QTC CALCULATION (BEZET), ECG08: 423 MS
RBC # BLD AUTO: 3.34 M/UL (ref 3.8–5.2)
RBC #/AREA URNS HPF: NORMAL /HPF (ref 0–5)
SAMPLES BEING HELD,HOLD: NORMAL
SODIUM SERPL-SCNC: 132 MMOL/L (ref 136–145)
SP GR UR REFRACTOMETRY: 1.02 (ref 1–1.03)
SPECIMEN EXP DATE BLD: NORMAL
TSH SERPL DL<=0.05 MIU/L-ACNC: 1.4 UIU/ML (ref 0.36–3.74)
UR CULT HOLD, URHOLD: NORMAL
UROBILINOGEN UR QL STRIP.AUTO: 0.2 EU/DL (ref 0.2–1)
VENTRICULAR RATE, ECG03: 112 BPM
WBC # BLD AUTO: 9.8 K/UL (ref 3.6–11)
WBC URNS QL MICRO: NORMAL /HPF (ref 0–4)

## 2019-07-09 PROCEDURE — 99218 HC RM OBSERVATION: CPT

## 2019-07-09 PROCEDURE — 83605 ASSAY OF LACTIC ACID: CPT

## 2019-07-09 PROCEDURE — 65270000029 HC RM PRIVATE

## 2019-07-09 PROCEDURE — 74011250637 HC RX REV CODE- 250/637: Performed by: INTERNAL MEDICINE

## 2019-07-09 PROCEDURE — 80053 COMPREHEN METABOLIC PANEL: CPT

## 2019-07-09 PROCEDURE — 80307 DRUG TEST PRSMV CHEM ANLYZR: CPT

## 2019-07-09 PROCEDURE — 84443 ASSAY THYROID STIM HORMONE: CPT

## 2019-07-09 PROCEDURE — 82140 ASSAY OF AMMONIA: CPT

## 2019-07-09 PROCEDURE — 81001 URINALYSIS AUTO W/SCOPE: CPT

## 2019-07-09 PROCEDURE — 93005 ELECTROCARDIOGRAM TRACING: CPT

## 2019-07-09 PROCEDURE — 70450 CT HEAD/BRAIN W/O DYE: CPT

## 2019-07-09 PROCEDURE — 85025 COMPLETE CBC W/AUTO DIFF WBC: CPT

## 2019-07-09 PROCEDURE — 86900 BLOOD TYPING SEROLOGIC ABO: CPT

## 2019-07-09 PROCEDURE — 96361 HYDRATE IV INFUSION ADD-ON: CPT

## 2019-07-09 PROCEDURE — 82550 ASSAY OF CK (CPK): CPT

## 2019-07-09 PROCEDURE — 85610 PROTHROMBIN TIME: CPT

## 2019-07-09 PROCEDURE — 96372 THER/PROPH/DIAG INJ SC/IM: CPT

## 2019-07-09 PROCEDURE — 71046 X-RAY EXAM CHEST 2 VIEWS: CPT

## 2019-07-09 PROCEDURE — 65390000012 HC CONDITION CODE 44 OBSERVATION

## 2019-07-09 PROCEDURE — 96360 HYDRATION IV INFUSION INIT: CPT

## 2019-07-09 PROCEDURE — 72125 CT NECK SPINE W/O DYE: CPT

## 2019-07-09 PROCEDURE — 87040 BLOOD CULTURE FOR BACTERIA: CPT

## 2019-07-09 PROCEDURE — 74011250636 HC RX REV CODE- 250/636: Performed by: INTERNAL MEDICINE

## 2019-07-09 PROCEDURE — 36415 COLL VENOUS BLD VENIPUNCTURE: CPT

## 2019-07-09 PROCEDURE — 99284 EMERGENCY DEPT VISIT MOD MDM: CPT

## 2019-07-09 PROCEDURE — 74011250636 HC RX REV CODE- 250/636: Performed by: EMERGENCY MEDICINE

## 2019-07-09 RX ORDER — OXYCODONE HYDROCHLORIDE 5 MG/1
20 TABLET ORAL DAILY
Status: DISCONTINUED | OUTPATIENT
Start: 2019-07-10 | End: 2019-07-10 | Stop reason: HOSPADM

## 2019-07-09 RX ORDER — LAMOTRIGINE 100 MG/1
300 TABLET ORAL
Status: DISCONTINUED | OUTPATIENT
Start: 2019-07-09 | End: 2019-07-10 | Stop reason: HOSPADM

## 2019-07-09 RX ORDER — OXYCODONE HYDROCHLORIDE 5 MG/1
20 TABLET ORAL EVERY EVENING
Status: DISCONTINUED | OUTPATIENT
Start: 2019-07-09 | End: 2019-07-10 | Stop reason: HOSPADM

## 2019-07-09 RX ORDER — OXYCODONE HYDROCHLORIDE 10 MG/1
20 TABLET ORAL EVERY EVENING
COMMUNITY
End: 2022-09-12 | Stop reason: SDUPTHER

## 2019-07-09 RX ORDER — OXYCODONE HYDROCHLORIDE 10 MG/1
10 TABLET ORAL
COMMUNITY

## 2019-07-09 RX ORDER — OXYCODONE HYDROCHLORIDE 5 MG/1
10 TABLET ORAL
Status: DISCONTINUED | OUTPATIENT
Start: 2019-07-09 | End: 2019-07-10 | Stop reason: HOSPADM

## 2019-07-09 RX ORDER — HEPARIN SODIUM 5000 [USP'U]/ML
5000 INJECTION, SOLUTION INTRAVENOUS; SUBCUTANEOUS EVERY 8 HOURS
Status: DISCONTINUED | OUTPATIENT
Start: 2019-07-09 | End: 2019-07-10 | Stop reason: HOSPADM

## 2019-07-09 RX ORDER — ACETAMINOPHEN 500 MG
500 TABLET ORAL DAILY
Status: DISCONTINUED | OUTPATIENT
Start: 2019-07-10 | End: 2019-07-10 | Stop reason: HOSPADM

## 2019-07-09 RX ORDER — TOPIRAMATE 100 MG/1
200 TABLET, FILM COATED ORAL
Status: DISCONTINUED | OUTPATIENT
Start: 2019-07-09 | End: 2019-07-10 | Stop reason: HOSPADM

## 2019-07-09 RX ORDER — ACETAMINOPHEN 500 MG
500 TABLET ORAL DAILY
COMMUNITY

## 2019-07-09 RX ORDER — SODIUM CHLORIDE 9 MG/ML
125 INJECTION, SOLUTION INTRAVENOUS CONTINUOUS
Status: DISCONTINUED | OUTPATIENT
Start: 2019-07-09 | End: 2019-07-10

## 2019-07-09 RX ORDER — SODIUM CHLORIDE 0.9 % (FLUSH) 0.9 %
5-40 SYRINGE (ML) INJECTION AS NEEDED
Status: DISCONTINUED | OUTPATIENT
Start: 2019-07-09 | End: 2019-07-10 | Stop reason: HOSPADM

## 2019-07-09 RX ORDER — SODIUM CHLORIDE 0.9 % (FLUSH) 0.9 %
5-40 SYRINGE (ML) INJECTION EVERY 8 HOURS
Status: DISCONTINUED | OUTPATIENT
Start: 2019-07-09 | End: 2019-07-10 | Stop reason: HOSPADM

## 2019-07-09 RX ORDER — ACETAMINOPHEN 500 MG
500 TABLET ORAL EVERY EVENING
COMMUNITY

## 2019-07-09 RX ORDER — TOPIRAMATE 200 MG/1
200 TABLET ORAL
COMMUNITY
End: 2020-09-24 | Stop reason: SDUPTHER

## 2019-07-09 RX ORDER — OXYCODONE HYDROCHLORIDE 10 MG/1
20 TABLET ORAL DAILY
COMMUNITY
End: 2022-09-12 | Stop reason: SDUPTHER

## 2019-07-09 RX ORDER — METOPROLOL SUCCINATE 50 MG/1
50 TABLET, EXTENDED RELEASE ORAL DAILY
Status: DISCONTINUED | OUTPATIENT
Start: 2019-07-10 | End: 2019-07-10 | Stop reason: HOSPADM

## 2019-07-09 RX ORDER — PANTOPRAZOLE SODIUM 40 MG/1
40 TABLET, DELAYED RELEASE ORAL
Status: DISCONTINUED | OUTPATIENT
Start: 2019-07-10 | End: 2019-07-10 | Stop reason: HOSPADM

## 2019-07-09 RX ORDER — OXYCODONE HYDROCHLORIDE 5 MG/1
10 TABLET ORAL
Status: DISCONTINUED | OUTPATIENT
Start: 2019-07-09 | End: 2019-07-09

## 2019-07-09 RX ORDER — GABAPENTIN 100 MG/1
100 CAPSULE ORAL 3 TIMES DAILY
Status: DISCONTINUED | OUTPATIENT
Start: 2019-07-09 | End: 2019-07-10 | Stop reason: HOSPADM

## 2019-07-09 RX ORDER — QUETIAPINE FUMARATE 100 MG/1
400 TABLET, FILM COATED ORAL
Status: DISCONTINUED | OUTPATIENT
Start: 2019-07-09 | End: 2019-07-10 | Stop reason: HOSPADM

## 2019-07-09 RX ADMIN — LAMOTRIGINE 300 MG: 100 TABLET ORAL at 21:53

## 2019-07-09 RX ADMIN — OXYCODONE HYDROCHLORIDE 20 MG: 5 TABLET ORAL at 19:02

## 2019-07-09 RX ADMIN — OXYCODONE HYDROCHLORIDE 10 MG: 5 TABLET ORAL at 23:28

## 2019-07-09 RX ADMIN — HEPARIN SODIUM 5000 UNITS: 5000 INJECTION INTRAVENOUS; SUBCUTANEOUS at 21:53

## 2019-07-09 RX ADMIN — SODIUM CHLORIDE 1000 ML: 900 INJECTION, SOLUTION INTRAVENOUS at 16:04

## 2019-07-09 RX ADMIN — Medication 10 ML: at 21:56

## 2019-07-09 RX ADMIN — TOPIRAMATE 200 MG: 100 TABLET, FILM COATED ORAL at 21:53

## 2019-07-09 RX ADMIN — QUETIAPINE FUMARATE 400 MG: 100 TABLET ORAL at 21:53

## 2019-07-09 RX ADMIN — SODIUM CHLORIDE 125 ML/HR: 900 INJECTION, SOLUTION INTRAVENOUS at 17:42

## 2019-07-09 RX ADMIN — GABAPENTIN 100 MG: 100 CAPSULE ORAL at 21:53

## 2019-07-09 NOTE — ED NOTES
Patient Throughput:  Charge nurse on 5th floor made aware of patient's room assignment, room [place] Raul RN  Shift Resource Nurse  Emergency Department

## 2019-07-09 NOTE — ED NOTES
TRANSFER - OUT REPORT:    Verbal report given to LEROY Reynoso(name) on Fadi Shipley  being transferred to [facility](unit) for routine progression of care       Report consisted of patients Situation, Background, Assessment and   Recommendations(SBAR). Information from the following report(s) SBAR, Kardex, ED Summary and MAR was reviewed with the receiving nurse. Lines:   Peripheral IV 07/09/19 Left Antecubital (Active)   Site Assessment Clean, dry, & intact 7/9/2019  1:28 PM   Phlebitis Assessment 0 7/9/2019  1:28 PM   Infiltration Assessment 0 7/9/2019  1:28 PM   Dressing Status Clean, dry, & intact 7/9/2019  1:28 PM   Dressing Type Tape;Transparent 7/9/2019  1:28 PM   Hub Color/Line Status Pink;Flushed;Patent 7/9/2019  1:28 PM   Action Taken Blood drawn 7/9/2019  1:28 PM       Peripheral IV 07/09/19 Right Forearm (Active)   Site Assessment Clean, dry, & intact 7/9/2019  1:29 PM   Phlebitis Assessment 0 7/9/2019  1:29 PM   Infiltration Assessment 0 7/9/2019  1:29 PM   Dressing Status Clean, dry, & intact 7/9/2019  1:29 PM   Dressing Type Tape;Transparent 7/9/2019  1:29 PM   Hub Color/Line Status Pink;Flushed;Patent 7/9/2019  1:29 PM   Action Taken Blood drawn 7/9/2019  1:29 PM        Opportunity for questions and clarification was provided.       Patient transported with:   [employer]

## 2019-07-09 NOTE — ED TRIAGE NOTES
Pt here for confusion and poor memory starting Friday.  reports she has been constantly repeating herself which is observed during triage. Pt also complain of fibromyalgia pain that has flared up in shoulders and right lower back.

## 2019-07-09 NOTE — PROGRESS NOTES
[facility]: MED RECONCILIATION    Information obtained from: Patient, her  and medication list     Allergies: Hand  [ethyl alcohol (skin cleanser)]    Prior to Admission Medications:     Medication Documentation Review Audit       Reviewed by Mac Irene PHARMD (Pharmacist) on 07/09/19 at 1656      Medication Sig Documenting Provider Last Dose Status Taking?   acetaminophen (TYLENOL EXTRA STRENGTH) 500 mg tablet Take 500 mg by mouth daily. Provider, Historical 7/9/2019 AM Active Yes   acetaminophen (TYLENOL EXTRA STRENGTH) 500 mg tablet Take 500 mg by mouth every evening. Provider, Historical 7/8/2019 Active Yes   amLODIPine-benazepril (LOTREL) 5-10 mg per capsule Take 1 Cap by mouth daily. Justin Colón MD 7/9/2019 AM Active Yes   dicyclomine (BENTYL) 20 mg tablet Take 20 mg by mouth four (4) times daily. Provider, Historical 7/9/2019 AM Active Yes              ergocalciferol (ERGOCALCIFEROL) 50,000 unit capsule Take 50,000 Units by mouth every Monday. Provider, Historical 7/8/2019 Active Yes   gabapentin (NEURONTIN) 100 mg capsule TAKE ONE CAPSULE BY MOUTH 3 TIMES A DAY AS NEEDED Ryanne Kamara MD  Active Yes   lamoTRIgine (LAMICTAL) 150 mg tablet Take 300 mg by mouth nightly. Provider, Historical 7/8/2019 Active Yes              metoprolol succinate (TOPROL-XL) 50 mg XL tablet Take 50 mg by mouth daily. Provider, Historical 7/9/2019 AM Active Yes              omeprazole (PRILOSEC) 20 mg capsule Take 20 mg by mouth daily. Provider, Historical 7/9/2019 AM Active Yes              oxyCODONE IR (ROXICODONE) 10 mg tab immediate release tablet Take 20 mg by mouth daily. Provider, Historical 7/9/2019 AM Active Yes   oxyCODONE IR (ROXICODONE) 10 mg tab immediate release tablet Take 10 mg by mouth daily as needed (Midday breakthrough pain). Provider, Historical  Active Yes   oxyCODONE IR (ROXICODONE) 10 mg tab immediate release tablet Take 20 mg by mouth every evening.  Provider, Historical 7/8/2019 Active Yes   QUEtiapine (SEROQUEL) 200 mg tablet Take 400 mg by mouth nightly. Provider, Historical 7/8/2019 Active Yes   topiramate (TOPAMAX) 200 mg tablet Take 200 mg by mouth nightly.  Provider, Historical 7/8/2019 Active Yes                        [address] [facility], PHARMD   Contact: [phone]

## 2019-07-09 NOTE — H&P
[address] [facility]  [address], [place] [address]  [phone]    Admission History and Physical      NAME:  Deana West   :   [id number]   MRN:  [id number]     PCP:  Caprice Marino MD     Date/Time:  2019         Subjective:     CHIEF COMPLAINT: confusion, pain      HISTORY OF PRESENT ILLNESS:     Ms. Ramiro Christopher is a 70 y.o.  female who is admitted with acute encephalopathy. Ms. Ramiro Christopher with PMH of bipolar disorder, HTN presented to ER c/o generalized body ache and confusion. According to her , pt has been having worsening generalized body pain for about 4 days. Recently, she became confuse and incoherent, but denies weakness. Pt is \"not making sense when she is talking\". In ER, her mentation has improved and pt could be able to provide history. Denies diarrhea or fever. Past Medical History:   Diagnosis Date    Bipolar affective ([facility] [address]     Chronic kidney disease     Hypertension     Ischemic colitis ([facility] [address] 2008    Lithium toxicity 2004    Stroke [facility])         Past Surgical History:   Procedure Laterality Date    HX ORTHOPAEDIC      back surgery 2007    HX ORTHOPAEDIC      right foot surgery        Social History     Tobacco Use    Smoking status: Former Smoker    Smokeless tobacco: Never Used   Substance Use Topics    Alcohol use: No     Alcohol/week: 0.0 oz        Family History   Problem Relation Age of Onset    Lung Disease Mother     Cancer Mother         Brain        Allergies   Allergen Reactions    Hand  [Ethyl Alcohol (Skin Cleanser)] Not Reported This Time        Prior to Admission medications    Medication Sig Start Date End Date Taking? Authorizing Provider   topiramate (TOPAMAX) 200 mg tablet Take 200 mg by mouth nightly. Yes Provider, Historical   oxyCODONE IR (ROXICODONE) 10 mg tab immediate release tablet Take 20 mg by mouth daily.    Yes Provider, Historical   oxyCODONE IR (ROXICODONE) 10 mg tab immediate release tablet Take 10 mg by mouth daily as needed (Midday breakthrough pain). Yes Provider, Historical   acetaminophen (TYLENOL EXTRA STRENGTH) 500 mg tablet Take 500 mg by mouth daily. Yes Provider, Historical   acetaminophen (TYLENOL EXTRA STRENGTH) 500 mg tablet Take 500 mg by mouth every evening. Yes Provider, Historical   oxyCODONE IR (ROXICODONE) 10 mg tab immediate release tablet Take 20 mg by mouth every evening. Yes Provider, Historical   gabapentin (NEURONTIN) 100 mg capsule TAKE ONE CAPSULE BY MOUTH 3 TIMES A DAY AS NEEDED 6/6/19  Yes Nadeen Baugh MD   dicyclomine (BENTYL) 20 mg tablet Take 20 mg by mouth four (4) times daily. 6/30/15  Yes Provider, Historical   lamoTRIgine (LAMICTAL) 150 mg tablet Take 300 mg by mouth nightly. 6/30/15  Yes Provider, Historical   metoprolol succinate (TOPROL-XL) 50 mg XL tablet Take 50 mg by mouth daily. 6/15/15  Yes Provider, Historical   omeprazole (PRILOSEC) 20 mg capsule Take 20 mg by mouth daily. 6/3/15  Yes Provider, Historical   QUEtiapine (SEROQUEL) 200 mg tablet Take 400 mg by mouth nightly. 6/14/15  Yes Provider, Historical   ergocalciferol (ERGOCALCIFEROL) 50,000 unit capsule Take 50,000 Units by mouth every Monday. Yes Provider, Historical   amLODIPine-benazepril (LOTREL) 5-10 mg per capsule Take 1 Cap by mouth daily.      Yes Other, MD Justin         Review of Systems:  (bold if positive, if negative)    Gen:  Eyes:  ENT:  CVS:  Pulm:  GI:    :    MS:  Skin:  Psych:  Endo:    Hem:  Renal:    Neuro:            Objective:      VITALS:    Vital signs reviewed; most recent are:    Visit Vitals  /87   Pulse (!) 125   Temp 98.7 °F (37.1 °C)   Resp 16   Ht 5' 5\" (1.651 m)   Wt 77.1 kg (170 lb)   SpO2 100%   BMI 28.29 kg/m²     SpO2 Readings from Last 6 Encounters:   07/09/19 100%   01/18/19 98%   09/28/18 97%   05/03/16 97%   02/11/15 99%   06/10/13 98%        No intake or output data in the 24 hours ending 07/09/19 1287 Exam:     Physical Exam:    Gen:  Well-developed, well-nourished, in no acute distress  HEENT:  Pink conjunctivae, PERRL, hearing intact to voice, moist mucous membranes  Neck:  Supple, without masses, thyroid non-tender  Resp:  No accessory muscle use, clear breath sounds without wheezes rales or rhonchi  Card:  No murmurs, normal S1, S2 without thrills, bruits or peripheral edema  Abd:  Soft, non-tender, non-distended, normoactive bowel sounds are present, no palpable organomegaly and no detectable hernias  Lymph:  No cervical or inguinal adenopathy  Musc:  No cyanosis or clubbing  Skin:  No rashes or ulcers, skin turgor is good  Neuro:  Cranial nerves are grossly intact, no focal motor weakness, follows commands appropriately  Psych:  Good insight, oriented to person, place and time, alert       Labs:    Recent Labs     07/09/19  1324   WBC 9.8   HGB 10.0*   HCT 31.0*        Recent Labs     07/09/19  1330   *   K 3.9      CO2 21   *   BUN 17   CREA 1.65*   CA 9.8   ALB 3.8   TBILI 0.5   SGOT 21   ALT 18     Lab Results   Component Value Date/Time    Glucose (POC) 97 07/29/2011 07:23 PM     No results for input(s): PH, PCO2, PO2, HCO3, FIO2 in the last 72 hours. Recent Labs     07/09/19  1324   INR 1.0       Telemetry reviewed:   normal sinus rhythm       Assessment/Plan:    1. Metabolic encephalopathy (4/9/1546). Likely due to medication vs dehydration. CT scan of the head is unremarkable. In ER, pt is more coherent and alert. Start IVF and monitor clinically      2. ARF (acute renal failure) ([address] (7/9/2019)/ Hyponatremia (7/9/2019). likely due to IVVD. Continue IVF and check renal function in am.     3.  Fibromyalgia (11/3/2010)/ chronic pain. Pt follows with pain doctor. Continue home pain meds with caution. 4.  Bipolar disorder ([address] (11/3/2010). Continue seraquel and topomax     5. HTN (hypertension) (7/9/2019). Continue metoprolol and amlodipine.  Hold ACE I due to AKD.     6.  Anemia (11/3/2010), mild. Likely due to chronic disease.  monitor    Previous medical records reviewed     Risk of deterioration: high      Total time spent with patient: 79 [address] [facility] discussed with: Patient, Family, Nursing Staff and >50% of time spent in counseling and coordination of care    Discussed:  Care Plan    Prophylaxis:  Hep SQ    Probable Disposition:  Home w/Family           ___________________________________________________    Attending Physician: Ronald Boswell MD
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
## 2019-07-09 NOTE — ED PROVIDER NOTES
Patient unable to state what happened, repetitive speech. History by . Patient has had 2 falls in the last 3 months. Unsure of any LOC. Saw neurology for frequent falls. Shoulder pain and hip pain, chronic in nature, bothering her today, h/o fibromyalgia. Last 5 days has been confused, with a bad memory, not making sense, keeps repeating herself. \"Blabbering on and on\" at home. Last head CT in system was in January. Tachycardic in triage. Neurologist: Alexa Ahn MD    I have evaluated the patient as the Provider in Triage. I have reviewed Her vital signs and the triage nurse assessment. I have talked with the patient and any available family and advised that I am the provider in triage and have ordered the appropriate study to initiate their work up based on the clinical presentation during my assessment. I have advised that the patient will be accommodated in the Main ED as soon as possible. I have also requested to contact the triage nurse or myself immediately if the patient experiences any changes in their condition during this brief waiting period. Note written by Neo West, as dictated by Angelika Lou MD 1:04 PM    70 y.o. female with past medical history significant for HTN, stage 3 CKD, fibromyalgia, bipolar affective, stroke, lithium toxicity, and ischemic colitis who presents from private vehicle with chief complaint of altered mental status. Pt's spouse reports altered mental status for 5 days. Pt's  notes Pt is confused. Per spouse, Pt is \"blabbering\". Per spouse, Pt is \"not making sense when she is talking\". Pt's spouse reports repetitive speech. Pt also c/o neck pain and fibromyalgia pain. Pt states she is unable to stand up and is having difficulty walking today. Pt reports history of frequent falls. Pt has had 2 falls in the last 3 months. Pt denies history of liver issues. Pt denies chest pain, SOB, bloody/black stools, or V/D.  There are no other acute medical concerns at this time. Social hx: Denies alcohol use, \"sober since 2004\". PCP: Lisa Gaston MD    Neurologist: Neurologist: Olman Simpson MD    Note written by Neo Reid, as dictated by Jerry Monroe MD 2:47 PM      The history is provided by the patient and the spouse. The history is limited by the condition of the patient. No  was used.         Past Medical History:   Diagnosis Date    B[facility] affective ([facility] [address]     Chronic kidney disease     Hypertension     Ischemic colitis ([facility] [address] 2008    Lithium toxicity 2004    Stroke [facility]) 2004       Past Surgical History:   Procedure Laterality Date    HX ORTHOPAEDIC      back surgery 2007    HX ORTHOPAEDIC      right foot surgery 2010         Family History:   Problem Relation Age of Onset    Lung Disease Mother     Cancer Mother         Brain       Social History     Socioeconomic History    Marital status:      Spouse name: Not on file    Number of children: Not on file    Years of education: Not on file    Highest education level: Not on file   Occupational History    Not on file   Social Needs    Financial resource strain: Not on file    Food insecurity:     Worry: Not on file     Inability: Not on file    Transportation needs:     Medical: Not on file     Non-medical: Not on file   Tobacco Use    Smoking status: Former Smoker    Smokeless tobacco: Never Used   Substance and Sexual Activity    Alcohol use: No     Alcohol/week: 0.0 oz    Drug use: No    Sexual activity: Yes     Partners: Male   Lifestyle    Physical activity:     Days per week: Not on file     Minutes per session: Not on file    Stress: Not on file   Relationships    Social connections:     Talks on phone: Not on file     Gets together: Not on file     Attends Bahai service: Not on file     Active member of club or organization: Not on file     Attends meetings of clubs or organizations: Not on file Relationship status: Not on file    Intimate partner violence:     Fear of current or ex partner: Not on file     Emotionally abused: Not on file     Physically abused: Not on file     Forced sexual activity: Not on file   Other Topics Concern    Not on file   Social History Narrative    Not on file         ALLERGIES: Hand  [ethyl alcohol (skin cleanser)]    Review of Systems   Respiratory: Negative for shortness of breath. Cardiovascular: Negative for chest pain. Gastrointestinal: Negative for blood in stool, diarrhea and vomiting. Musculoskeletal: Positive for myalgias and neck pain. Psychiatric/Behavioral: Positive for confusion. All other systems reviewed and are negative.       Vitals:    07/09/19 1307   BP: 143/69   Pulse: (!) 125   Resp: 16   Temp: 98.7 °F (37.1 °C)   SpO2: 98%   Weight: 77.1 kg (170 lb)   Height: 5' 5\" (1.651 m)            Physical Exam   Physical Examination: General appearance - alert, chronically ill appearing, anxious  Eyes - pupils equal and reactive, extraocular eye movements intact  Neck - supple, no significant adenopathy  Chest - clear to auscultation, no wheezes, rales or rhonchi, symmetric air entry  Heart - regular tachycardia, normal S1, S2, no murmurs, rubs, clicks or gallops  Abdomen - soft, nontender, nondistended, no masses or organomegaly  Back exam - full range of motion, no tenderness, palpable spasm or pain on motion  Neurological - alert, oriented to person and place only, normal speech, normal f-n-f, no nystagmus, no pronator drift, generalized weakness, no focal weakness  Musculoskeletal - no joint tenderness, deformity or swelling  Extremities - peripheral pulses normal, no pedal edema, no clubbing or cyanosis  Skin - normal coloration and turgor, no rashes, no suspicious skin lesions noted  MDM  Number of Diagnoses or Management Options     Amount and/or Complexity of Data Reviewed  Clinical lab tests: ordered and reviewed  Tests in the radiology section of CPT®: ordered and reviewed  Decide to obtain previous medical records or to obtain history from someone other than the patient: yes  Obtain history from someone other than the patient: yes (spouse)  Review and summarize past medical records: yes  Independent visualization of images, tracings, or specimens: yes    Patient Progress  Patient progress: stable         Procedures    ED EKG interpretation:  Rhythm: sinus tachycardia; and regular . Rate (approx.): 112 bpm; No ST elevations or depressions; Inferior TWIs; normal intervals; Normal axis. Note written by Neo Newell, as dictated by Jose Elias Reddy MD 1:54 PM    Hospitalist Eva for Admission-Tameka  4:17 PM    ED Room Number: ER03/03  Patient Name and age:  Julian Gillespie 70 y.o.  female  Working Diagnosis:   1. Altered mental status, unspecified altered mental status type    2. Myalgia    3. Renal insufficiency    4. Sinus tachycardia      Readmission: no  Isolation Requirements:  no  Recommended Level of Care:  telemetry  Code Status:  Full    Updated pt and spouse on test results and plan for admission.
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
## 2019-07-09 NOTE — PROGRESS NOTES
Bedside and Verbal shift change report given to Rc Gabriel RN (oncoming nurse) by [address], RN  (offgoing nurse). Report included the following information SBAR, Kardex, Intake/Output, MAR and Accordion.

## 2019-07-09 NOTE — PROGRESS NOTES
7/9/2019  5:18 PM  Case management note    Reason for Admission:   AMS  Patient came to hospital today for AMS. Over the last months, patient more confused and has had falls. Patient has walker but does not uses.  assist with some ADL's. Patient refers to  for answers. JORDON Devin Basim Uriostegui/ patient and family would benefit from care conference for goals of care and long term planning to prevent frequent ED visits and readmission. Patient and  live in single story home with 5 steps to enter. RRAT Score:          5           Plan for utilizing home health: To be determine by PT/OT                    Current Advanced Directive/Advance Care Plan:  Not on file                         Transition of Care Plan:                1. PCP follow up  2. Long term planning   3. PT/OT for discharge planning  4. Goals of care  5.  CM to follow until discharge          Care Management Interventions  PCP Verified by CM: Yes(dr abiel garner no nn)  Mode of Transport at Discharge: Self  Transition of Care Consult (CM Consult): Discharge Planning  Current Support Network: Lives with Spouse  Plan discussed with Pt/Family/Caregiver: Yes  Discharge Location  Discharge Placement: Home with family assistance  [place], [place]

## 2019-07-10 VITALS
DIASTOLIC BLOOD PRESSURE: 60 MMHG | SYSTOLIC BLOOD PRESSURE: 126 MMHG | RESPIRATION RATE: 17 BRPM | HEART RATE: 103 BPM | HEIGHT: 65 IN | WEIGHT: 170 LBS | TEMPERATURE: 98.1 F | BODY MASS INDEX: 28.32 KG/M2 | OXYGEN SATURATION: 97 %

## 2019-07-10 PROBLEM — N17.9 ARF (ACUTE RENAL FAILURE) (HCC): Status: RESOLVED | Noted: 2019-07-09 | Resolved: 2019-07-10

## 2019-07-10 PROBLEM — G93.40 ENCEPHALOPATHY: Status: RESOLVED | Noted: 2019-07-09 | Resolved: 2019-07-10

## 2019-07-10 PROBLEM — E87.1 HYPONATREMIA: Status: RESOLVED | Noted: 2019-07-09 | Resolved: 2019-07-10

## 2019-07-10 PROBLEM — G93.40 ACUTE ENCEPHALOPATHY: Status: RESOLVED | Noted: 2019-07-09 | Resolved: 2019-07-10

## 2019-07-10 PROBLEM — G93.40 ACUTE ENCEPHALOPATHY: Status: ACTIVE | Noted: 2019-07-10

## 2019-07-10 LAB
ANION GAP SERPL CALC-SCNC: 7 MMOL/L (ref 5–15)
BUN SERPL-MCNC: 11 MG/DL (ref 6–20)
BUN/CREAT SERPL: 11 (ref 12–20)
CALCIUM SERPL-MCNC: 9.1 MG/DL (ref 8.5–10.1)
CHLORIDE SERPL-SCNC: 110 MMOL/L (ref 97–108)
CO2 SERPL-SCNC: 23 MMOL/L (ref 21–32)
CREAT SERPL-MCNC: 1 MG/DL (ref 0.55–1.02)
ERYTHROCYTE [DISTWIDTH] IN BLOOD BY AUTOMATED COUNT: 12.6 % (ref 11.5–14.5)
GLUCOSE SERPL-MCNC: 117 MG/DL (ref 65–100)
HCT VFR BLD AUTO: 27.3 % (ref 35–47)
HGB BLD-MCNC: 8.6 G/DL (ref 11.5–16)
MCH RBC QN AUTO: 30 PG (ref 26–34)
MCHC RBC AUTO-ENTMCNC: 31.5 G/DL (ref 30–36.5)
MCV RBC AUTO: 95.1 FL (ref 80–99)
NRBC # BLD: 0 K/UL (ref 0–0.01)
NRBC BLD-RTO: 0 PER 100 WBC
PLATELET # BLD AUTO: 258 K/UL (ref 150–400)
PMV BLD AUTO: 9.4 FL (ref 8.9–12.9)
POTASSIUM SERPL-SCNC: 3.8 MMOL/L (ref 3.5–5.1)
RBC # BLD AUTO: 2.87 M/UL (ref 3.8–5.2)
SODIUM SERPL-SCNC: 140 MMOL/L (ref 136–145)
WBC # BLD AUTO: 6.8 K/UL (ref 3.6–11)

## 2019-07-10 PROCEDURE — 97116 GAIT TRAINING THERAPY: CPT

## 2019-07-10 PROCEDURE — 97161 PT EVAL LOW COMPLEX 20 MIN: CPT

## 2019-07-10 PROCEDURE — 74011250637 HC RX REV CODE- 250/637: Performed by: INTERNAL MEDICINE

## 2019-07-10 PROCEDURE — 80048 BASIC METABOLIC PNL TOTAL CA: CPT

## 2019-07-10 PROCEDURE — 97535 SELF CARE MNGMENT TRAINING: CPT

## 2019-07-10 PROCEDURE — 99218 HC RM OBSERVATION: CPT

## 2019-07-10 PROCEDURE — 96372 THER/PROPH/DIAG INJ SC/IM: CPT

## 2019-07-10 PROCEDURE — 96361 HYDRATE IV INFUSION ADD-ON: CPT

## 2019-07-10 PROCEDURE — 97165 OT EVAL LOW COMPLEX 30 MIN: CPT

## 2019-07-10 PROCEDURE — 85027 COMPLETE CBC AUTOMATED: CPT

## 2019-07-10 PROCEDURE — 74011250636 HC RX REV CODE- 250/636: Performed by: INTERNAL MEDICINE

## 2019-07-10 PROCEDURE — 36415 COLL VENOUS BLD VENIPUNCTURE: CPT

## 2019-07-10 PROCEDURE — 65390000012 HC CONDITION CODE 44 OBSERVATION

## 2019-07-10 RX ADMIN — ACETAMINOPHEN 500 MG: 500 TABLET ORAL at 09:37

## 2019-07-10 RX ADMIN — GABAPENTIN 100 MG: 100 CAPSULE ORAL at 09:37

## 2019-07-10 RX ADMIN — SODIUM CHLORIDE 125 ML/HR: 900 INJECTION, SOLUTION INTRAVENOUS at 06:27

## 2019-07-10 RX ADMIN — OXYCODONE HYDROCHLORIDE 20 MG: 5 TABLET ORAL at 09:37

## 2019-07-10 RX ADMIN — HEPARIN SODIUM 5000 UNITS: 5000 INJECTION INTRAVENOUS; SUBCUTANEOUS at 06:28

## 2019-07-10 RX ADMIN — PANTOPRAZOLE SODIUM 40 MG: 40 TABLET, DELAYED RELEASE ORAL at 06:28

## 2019-07-10 RX ADMIN — Medication 10 ML: at 06:28

## 2019-07-10 RX ADMIN — METOPROLOL SUCCINATE 50 MG: 50 TABLET, FILM COATED, EXTENDED RELEASE ORAL at 09:37

## 2019-07-10 NOTE — DISCHARGE INSTRUCTIONS
Diet as before  Activity as before with fall precautions  Check CBC/BMP 1 week at PCP office  return to ER or call PCP if symptoms gets worse or reoccur  F/u PCP 3-5 days  F/u ortho 1 week for neck arthritis
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
## 2019-07-10 NOTE — PROGRESS NOTES
Discharge order noted. Patient made aware. Awaiting  to transport patient to home. 0825: Patient to be seen by PT, OT and CM. Discharge pending those consults. 1150: Patient's  at bedside, still awaiting [facility] set up.     1210: Patient and  agitated at home health care delay. Spoke with Nury Muhammad CM to try and accelerate process. Patient states she is ready to leave without [facility] set up.    1310: Patient given discharge instructions. Patient and  verbalize understanding of discharge instructions and need to see Dr. Caitlyn Le for home care set up. PIV removed. Patient out of unit in wheelchair in stable condition,  driving patient to home.

## 2019-07-10 NOTE — PROGRESS NOTES
1313:  Pt d/c'd to home transported by her . She will stay at her daughter's home until the weekend and didn't want hh to come to her house until next week. No NN. LEROY Chavez    1140:  Order for home health noted. Pt chose [facility]. A referral was sent in [address] [place] [address]. LEROY Chavez CM Note:    Virtual reviewer communicated change to CM which reflect outpatient observation order written prior to discharge order being written. Code 44 delivered to patient. CM met with the patient and provided to the patient the printed information about her outpatient observation status. The patient was given the flyer entitled, \"Medicare Outpatient Observation: Information & notification. \" All questions were answered, no additional discharge needs identified at this time and patient expects to return to their (home, assisted living facility, relatives home, etc.) after discharge today. Oral and Written notification given to patient and/or caregiver informing them that they are currently an Outpatient receiving care in our facility. Outpatient services include Observation Services under [place] and [place] requirements.   LEROY Chavez

## 2019-07-10 NOTE — PROGRESS NOTES
Pharmacist Discharge Medication Reconciliation    Discharge Provider:  Chrissie       Discharge Medications:      My Medications        CONTINUE taking these medications        Instructions Each Dose to Equal Morning Noon Evening Bedtime   amLODIPine-benazepril 5-10 mg per capsule  Commonly known as:  LOTREL    Your last dose was: Your next dose is: Take 1 Cap by mouth daily. 1 Cap                 dicyclomine 20 mg tablet  Commonly known as:  BENTYL    Your last dose was: Your next dose is: Take 20 mg by mouth four (4) times daily. 20 mg                 ergocalciferol 50,000 unit capsule  Commonly known as:  ERGOCALCIFEROL    Your last dose was: Your next dose is: Take 50,000 Units by mouth every Monday. [address]656 Units                 gabapentin 100 mg capsule  Commonly known as:  NEURONTIN    Your last dose was: Your next dose is:          TAKE ONE CAPSULE BY MOUTH 3 TIMES A DAY AS NEEDED                  lamoTRIgine 150 mg tablet  Commonly known as: LaMICtal    Your last dose was: Your next dose is: Take 300 mg by mouth nightly. 300 mg                 metoprolol succinate 50 mg XL tablet  Commonly known as:  TOPROL-XL    Your last dose was: Your next dose is: Take 50 mg by mouth daily. 50 mg                 omeprazole 20 mg capsule  Commonly known as:  PRILOSEC    Your last dose was: Your next dose is: Take 20 mg by mouth daily. 20 mg                 * oxyCODONE IR 10 mg Tab immediate release tablet  Commonly known as:  ROXICODONE    Your last dose was: Your next dose is: Take 20 mg by mouth daily. 20 mg                 * oxyCODONE IR 10 mg Tab immediate release tablet  Commonly known as:  ROXICODONE    Your last dose was: Your next dose is: Take 10 mg by mouth daily as needed (Midday breakthrough pain).    10 mg                 * oxyCODONE IR 10 mg Tab immediate release tablet  Commonly known as: ROXICODONE    Your last dose was: Your next dose is: Take 20 mg by mouth every evening. 20 mg                 QUEtiapine 200 mg tablet  Commonly known as:  SEROquel    Your last dose was: Your next dose is: Take 400 mg by mouth nightly. 400 mg                 topiramate 200 mg tablet  Commonly known as:  TOPAMAX    Your last dose was: Your next dose is: Take 200 mg by mouth nightly. 200 mg                 * TYLENOL EXTRA STRENGTH 500 mg tablet  Generic drug:  acetaminophen    Your last dose was: Your next dose is: Take 500 mg by mouth daily. 500 mg                 * TYLENOL EXTRA STRENGTH 500 mg tablet  Generic drug:  acetaminophen    Your last dose was: Your next dose is: Take 500 mg by mouth every evening. 500 mg                       * This list has 5 medication(s) that are the same as other medications prescribed for you. Read the directions carefully, and ask your doctor or other care provider to review them with you.                        The patient's chart, MAR, and AVS were reviewed by   FRED Verdugo,   Contact: [phone]

## 2019-07-10 NOTE — PHYSICIAN ADVISORY
Short Stay Review       Pt Name:  Juvenal Cooper   MR#  [id number]   CSN#   [id number]   [address] [place] [address]  [phone]  @ [address] [place] [address]   Hospitalization date  7/9/2019  1:10 PM  No discharge date for patient encounter. Current Attending Physician  Bebeto Dickens MD     A discharge order has been placed for this episode of hospital care for Ms. Juvenal Cooper; since this hospital stay is less than two midnights, I reviewed Ms. Jill Green's chart. Ms. Jill Green's healthcare insurance/benefit include:  Payor: [place] MEDICARE / Plan: VA MEDICARE PART A & B / Product Type: Medicare /     Utilization Review related case summary:   Age  70 y.o.   BMI  Body mass index is 28.29 kg/m².     PMHx includes     Hospital course  Pt was admitted with NETTIE, Acute encephalopathy now attributed to combination of dehydration, and medication adverse effects    Risk of deterioration at the time this patient  was hospitalized     Moderate            On the basis of chart review, this patient's hospitalization status         Should be changed to [address] MD MPH FACP   Cell : [phone]  Physician [address] [place] [address]    [facility]   Utilization Review, Care Management         CSN:  [id number]   JUVENTINO:   [id number]  Admitted on :  7/9/2019   Discharge order

## 2019-07-10 NOTE — PROGRESS NOTES
physical Therapy EVALUATION/DISCHARGE  Patient: Deloris Garcia (35 y.o. female)  Date: 7/10/2019  Primary Diagnosis: Encephalopathy [G93.40]  Acute encephalopathy [G93.40]  Acute encephalopathy [G93.40]       Precautions: fall     ASSESSMENT :  Based on the objective data described above, the patient presents with supervision with ambulation without assistive device as well as up and down stairs and independent with all functional mobility. Patient at her baseline level of function being discharge back to home today recommending HHPT for safety eval. Reviewed all safety precaution and home exercise program with the patient, verbalized understanding, clear to go home per Physical Therapy perspective. Skilled physical therapy is not indicated at this time. PLAN :  Discharge Recommendations: Home Health  Further Equipment Recommendations for Discharge: already has DME's     SUBJECTIVE:   Patient stated Nicolette Miller.     OBJECTIVE DATA SUMMARY:   HISTORY:    Past Medical History:   Diagnosis Date    [facility]     Chronic kidney disease     Hypertension     Ischemic colitis ([facility] [facility] 2008    Lithium toxicity 2004    Stroke [facility]) 2004     Past Surgical History:   Procedure Laterality Date    HX ORTHOPAEDIC      back surgery 2007    HX ORTHOPAEDIC      right foot surgery 2010     Prior Level of Function/Home Situation: lives in a single level house with steps to enter has a rolling walker but not using it.   Personal factors and/or comorbidities impacting plan of care:     Home Situation  Home Environment: Private residence  # Steps to Enter: 6  Rails to Enter: Yes  One/Two Story Residence: One story  Living Alone: No  Support Systems: Spouse/Significant Other/Partner  Patient Expects to be Discharged to[de-identified] Private residence  Current DME Used/Available at Home: Jhonathan Groom, straight, Walker  Tub or Shower Type: Tub/Shower combination    EXAMINATION/PRESENTATION/DECISION MAKING:   Critical Behavior:  Neurologic State: Alert  Orientation Level: Oriented X4  Cognition: Follows commands  Safety/Judgement: Awareness of environment, Fall prevention, Decreased awareness of need for safety  Hearing: Auditory  Auditory Impairment: None    Range Of Motion:  AROM: Within functional limits           PROM: Within functional limits           Strength:    Strength: Within functional limits                    Tone & Sensation:   Tone: Normal              Sensation: Intact               Coordination:  Coordination: Within functional limits  Vision:   Acuity: Within Defined Limits  Functional Mobility:  Bed Mobility:  Rolling: Independent  Supine to Sit: Independent; Additional time  Sit to Supine: Independent  Scooting: Independent  Transfers:  Sit to Stand: Modified independent  Stand to Sit: Modified independent  Stand Pivot Transfers: Supervision     Bed to Chair: Supervision              Balance:   Sitting: Intact  Standing: Intact; Without support  Standing - Static: Good  Standing - Dynamic : Fair  Ambulation/Gait Training:  Distance (ft): 200 Feet (ft)     Ambulation - Level of Assistance: Supervision     Gait Description (WDL): Within defined limits  Gait Abnormalities: Path deviations                                           Stairs:  Number of Stairs Trained: 4  Stairs - Level of Assistance: Supervision   Rail Use: Both     Therapeutic Exercises:    Instructed patient to continue active range of motion exercise on both legs while up on chair or on bed. Functional Measure:  Barthel Index:    Bathin  Bladder: 10  Bowels: 10  Groomin  Dressing: 10  Feeding: 10  Mobility: 10  Stairs: 5  Toilet Use: 10  Transfer (Bed to Chair and Back): 10  Total: 80/100       Percentage of impairment   0%   1-19%   20-39%   40-59%   60-79%   80-99%   100%   Barthel Score 0-100 100 99-80 79-60 59-40 20-39 1-19   0     The Barthel ADL Index: Guidelines  1.  The index should be used as a record of what a patient does, not as a record of what a patient could do. 2. The main aim is to establish degree of independence from any help, physical or verbal, however minor and for whatever reason. 3. The need for supervision renders the patient not independent. 4. A patient's performance should be established using the best available evidence. Asking the patient, friends/relatives and nurses are the usual sources, but direct observation and common sense are also important. However direct testing is not needed. 5. Usually the patient's performance over the preceding 24-48 hours is important, but occasionally longer periods will be relevant. 6. Middle categories imply that the patient supplies over 50 per cent of the effort. 7. Use of aids to be independent is allowed. Constance Mayo., Barthel, D.W. (1003). Functional evaluation: the Barthel Index. [facility] (14)2. ORI Dickson, Jarrett Restrepo., Kristin Button., Danny, [facility] (1999). Measuring the change indisability after inpatient rehabilitation; comparison of the responsiveness of the Barthel Index and Functional [place] Measure. Journal of Neurology, Neurosurgery, and Psychiatry, 66(4), 916-098. Mina Howard, N.J.A, CLAUDINE Gutiérrez, & Fatmata Damon M.A. (2004.) Assessment of post-stroke quality of life in cost-effectiveness studies: The usefulness of the Barthel Index and the EuroQoL-5D.  Quality of Life Research, 15, 217-70          Physical Therapy Evaluation Charge Determination   History Examination Presentation Decision-Making   LOW Complexity : Zero comorbidities / personal factors that will impact the outcome / POC LOW Complexity : 1-2 Standardized tests and measures addressing body structure, function, activity limitation and / or participation in recreation  LOW Complexity : Stable, uncomplicated  Other outcome measures barthel  LOW       Based on the above components, the patient evaluation is determined to be of the following complexity level: LOW     Pain:  Pain Scale 1: Numeric (0 - 10)  Pain Intensity 1: 8  Pain Location 1: Shoulder;Back;Neck  Activity Tolerance:   Good. Please refer to the flowsheet for vital signs taken during this treatment. After treatment:   []   Patient left in no apparent distress sitting up in chair  [x]   Patient left in no apparent distress in bed to chair position  [x]   Call bell left within reach  [x]   Nursing notified  []   Caregiver present  [x]   Bed alarm activated    COMMUNICATION/EDUCATION:   Communication/Collaboration:  [x]   Fall prevention education was provided and the patient/caregiver indicated understanding. [x]   Patient/family have participated as able and agree with findings and recommendations. []   Patient is unable to participate in plan of care at this time. Findings and recommendations were discussed with: Occupational Therapist, Registered Nurse,  and patient    Thank you for this referral.  Beverly Kramer, PT,WCC.    Time Calculation: 27 mins
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
## 2019-07-10 NOTE — DISCHARGE SUMMARY
Hospitalist Discharge Summary     Patient ID:  Danny Cervantes  [id number]  37 y.o.  1950 7/9/2019    PCP on record: Donald Parker MD    Admit date: 7/9/2019  Discharge date and time: 7/10/2019    DISCHARGE DIAGNOSIS:  Acute metabolic encephalopathy from ARF and medications  ARF  Chr pain on narcotics    ______________________________________________________________________  DISCHARGE SUMMARY/HOSPITAL COURSE:       1. Acute Metabolic encephalopathy (1/7/0722). Likely due to medication with dehydration. Resolved with IVF  CT scan of the head is unremarkable. Now AAO x 3  Stable for discharge  In Future advised to check with PCP for tapering off  multiple meds with opiates/neuronticn can cause ams     2. ARF  resovled with IVF     3. Fibromyalgia (11/3/2010)/ chronic pain. Pt follows with pain doctor. Continue home pain meds with caution. F/u pain doctor as before     4. Bipolar disorder ([address] (11/3/2010). Continue seraquel and topomax      5. HTN (hypertension) (7/9/2019). Continue metoprolol and amlodipine.        6. Anemia (11/3/2010), mild. Likely due to chronic disease. Monitor CBC as OP    7. Cervical DJD advised OP ortho f/u. She claims it is chronic. Dispo: Stable for discharge to go home today. Patient agreed and verbalized understanding of discharge plan and instructions. _______________________________________________________________________  Patient seen and examined by me on discharge day. Gen:    Not in distress  Chest: Symmetric expansion  CVS: no edema  Neuro:  AAO X 4  _______________________________________________________________________  DISCHARGE MEDICATIONS:   Current Discharge Medication List      CONTINUE these medications which have NOT CHANGED    Details   topiramate (TOPAMAX) 200 mg tablet Take 200 mg by mouth nightly. !! oxyCODONE IR (ROXICODONE) 10 mg tab immediate release tablet Take 20 mg by mouth daily.       !! oxyCODONE IR (ROXICODONE) 10 mg tab immediate release tablet Take 10 mg by mouth daily as needed (Midday breakthrough pain). !! acetaminophen (TYLENOL EXTRA STRENGTH) 500 mg tablet Take 500 mg by mouth daily. !! acetaminophen (TYLENOL EXTRA STRENGTH) 500 mg tablet Take 500 mg by mouth every evening. !! oxyCODONE IR (ROXICODONE) 10 mg tab immediate release tablet Take 20 mg by mouth every evening.      gabapentin (NEURONTIN) 100 mg capsule TAKE ONE CAPSULE BY MOUTH 3 TIMES A DAY AS NEEDED  Qty: 90 Cap, Refills: 1      dicyclomine (BENTYL) 20 mg tablet Take 20 mg by mouth four (4) times daily. Refills: 1      lamoTRIgine (LAMICTAL) 150 mg tablet Take 300 mg by mouth nightly. Refills: 1      metoprolol succinate (TOPROL-XL) 50 mg XL tablet Take 50 mg by mouth daily. Refills: 0      omeprazole (PRILOSEC) 20 mg capsule Take 20 mg by mouth daily. Refills: 6      QUEtiapine (SEROQUEL) 200 mg tablet Take 400 mg by mouth nightly.      ergocalciferol (ERGOCALCIFEROL) 50,000 unit capsule Take 50,000 Units by mouth every Monday. amLODIPine-benazepril (LOTREL) 5-10 mg per capsule Take 1 Cap by mouth daily. !! - Potential duplicate medications found. Please discuss with provider.         Total time in minutes spent coordinating this discharge 21 minutes    Signed:  Shira Burciaga MD

## 2019-07-10 NOTE — PROGRESS NOTES
CM Note:  Pt accepted by [facility]. Pt will be seen next [facility] after she has a f/u visit with her MD.  [employer] will contact pt next week.   LEROY Chavez

## 2019-07-10 NOTE — PROGRESS NOTES
Bedside shift change report given to Helena Guevara RN (oncoming nurse) by Temi Silva RN (offgoing nurse). Report included the following information SBAR and Kardex.

## 2019-07-10 NOTE — PROGRESS NOTES
Occupational Therapy EVALUATION/discharge  Patient: Inga Gallegos (79 y.o. female)  Date: 7/10/2019  Primary Diagnosis: Encephalopathy [G93.40]  Acute encephalopathy [G93.40]  Acute encephalopathy [G93.40]       Precautions:       ASSESSMENT:   Cleared by RN to see pt for therapy session. Based on the objective data described below, the patient presents with decreased balance, endurance and safety awareness following admission for encephalopathy. At baseline she lives with her  and reports I with ADLs and functional mobility, although has had falls when transferring out of car with hands full. Received supine in bed, agreeable to participate. Alert and oriented x4 although with occasional incongruent or tangential conversation. Bed mobility mod I with increased time, overall SBA for functional mobility, mildly unsteady and with mild limp, reports mild pain L foot and back. SBA for standing UB and LB ADLs and supervision-mod I for seated LB ADLs during session. Returned to bed at end of session, placed in chair position, call bell in reach and alarm set. Pt reports feeling much better today and near her baseline. Recommend HH therapy at discharge for safety evaluation. Further skilled acute occupational therapy is not indicated at this time. Discharge Recommendations: Home Health  Further Equipment Recommendations for Discharge: none for OT      SUBJECTIVE:   Patient stated I fall out of the car sometimes when I'm holding things.     OBJECTIVE DATA SUMMARY:   HISTORY:   Past Medical History:   Diagnosis Date    [facility] [address]     Chronic kidney disease     Hypertension     [facility]schemic colitis ([facility] [address] 2008    Lithium toxicity 2004    Stroke [facility]) 2004     Past Surgical History:   Procedure Laterality Date    HX ORTHOPAEDIC      back surgery 2007    HX ORTHOPAEDIC      right foot surgery 2010       Prior Level of Function/Environment/Context: At baseline she lives with her  and reports I with ADLs and functional mobility, although has had falls when transferring out of car with hands full. Denies falls while walking. Home Situation  Home Environment: Private residence  # Steps to Enter: 6  Rails to Enter: Yes  One/Two Story Residence: One story  Living Alone: No  Support Systems: Spouse/Significant Other/Partner  Patient Expects to be Discharged to[de-identified] Private residence  Current DME Used/Available at Home: [address], [place], straight, [place]  Tub or Shower Type: Tub/Shower combination    Hand dominance: Right    EXAMINATION OF PERFORMANCE DEFICITS:  Cognitive/Behavioral Status:  Neurologic State: Alert  Orientation Level: Oriented X4  Cognition: Follows commands  Perception: Appears intact  Perseveration: No perseveration noted  Safety/Judgement: Awareness of environment; Fall prevention;Decreased awareness of need for safety      Hearing: Auditory  Auditory Impairment: None    Vision/Perceptual:            Acuity: Within Defined Limits         Range of Motion:  AROM: Within functional limits  PROM: Within functional limits        Strength:  Strength: Within functional limits       Coordination:  Coordination: Within functional limits  Fine Motor Skills-Upper: Left Intact; Right Intact    Gross Motor Skills-Upper: Left Intact; Right Intact    Tone & Sensation:  Tone: Normal  Sensation: Intact     Balance:  Sitting: Intact  Standing: Intact; Without support  Standing - Static: Good  Standing - Dynamic : Fair    Functional Mobility and Transfers for ADLs:  Bed Mobility:  Rolling: Independent  Supine to Sit: Independent; Additional time  Sit to Supine: Independent  Scooting: Independent    Transfers:  Sit to Stand: Modified independent  Stand to Sit: Modified independent  Bed to Chair: Supervision  Toilet Transfer : Supervision    ADL Assessment:  Feeding: Independent    Oral Facial Hygiene/Grooming: Supervision(standing)    Bathing: Contact guard assistance    Upper Body Dressing: Modified independent    Lower Body Dressing: Supervision    Toileting: Modified independent       ADL Intervention and task modifications:   Educated on fall prevention strategies keeping hands free during transfers, avoiding lifting heavy objects due to back pain or twisting when holding onto rails. Grooming  Washing Hands: Supervision(standing at sink)    Lower Body Dressing Assistance  Socks: Supervision  Leg Crossed Method Used: Yes  Position Performed: Seated edge of bed    Toileting  Bladder Hygiene: Modified independent  Clothing Management: Supervision    Cognitive Retraining  Safety/Judgement: Awareness of environment; Fall prevention;Decreased awareness of need for safety    Functional Measure:  Barthel Index:    Bathin  Bladder: 10  Bowels: 10  Groomin  Dressing: 10  Feeding: 10  Mobility: 10  Stairs: 5  Toilet Use: 10  Transfer (Bed to Chair and Back): 10  Total: 80/100        Percentage of impairment   0%   1-19%   20-39%   40-59%   60-79%   80-99%   100%   Barthel Score 0-100 100 99-80 79-60 59-40 20-39 1-19   0     The Barthel ADL Index: Guidelines  1. The index should be used as a record of what a patient does, not as a record of what a patient could do. 2. The main aim is to establish degree of independence from any help, physical or verbal, however minor and for whatever reason. 3. The need for supervision renders the patient not independent. 4. A patient's performance should be established using the best available evidence. Asking the patient, friends/relatives and nurses are the usual sources, but direct observation and common sense are also important. However direct testing is not needed. 5. Usually the patient's performance over the preceding 24-48 hours is important, but occasionally longer periods will be relevant. 6. Middle categories imply that the patient supplies over 50 per cent of the effort. 7. Use of aids to be independent is allowed. Radha Gan, Barthel, D.W. ([id number]).  Functional evaluation: the Barthel Index. [facility] (14)2. ORI Haley, Roberto Calderon., Lilian Hernandes., Danny, [address] (1999). Measuring the change indisability after inpatient rehabilitation; comparison of the responsiveness of the Barthel Index and Functional [place] Measure. Journal of Neurology, Neurosurgery, and Psychiatry, 66(4), 247-084. KIRSTEN Love, CALUDINE Gutiérrez, & Delmus Brittle, M.A. (2004.) Assessment of post-stroke quality of life in cost-effectiveness studies: The usefulness of the Barthel Index and the EuroQoL-5D. Quality of Life Research, 15, 526-76       Occupational Therapy Evaluation Charge Determination   History Examination Decision-Making   LOW Complexity : Brief history review  LOW Complexity : 1-3 performance deficits relating to physical, cognitive , or psychosocial skils that result in activity limitations and / or participation restrictions  LOW Complexity : No comorbidities that affect functional and no verbal or physical assistance needed to complete eval tasks       Based on the above components, the patient evaluation is determined to be of the following complexity level: LOW   Pain:  Pain Scale 1: Numeric (0 - 10)  Pain Intensity 1: 8  Pain Location 1: Shoulder;Back;Neck  Pain Orientation 1: Posterior  Pain Description 1: Aching  Pain Intervention(s) 1: Medication (see MAR)  Activity Tolerance:   Good  Please refer to the flowsheet for vital signs taken during this treatment. After treatment:   []  Patient left in no apparent distress sitting up in chair  [x]  Patient left in no apparent distress in bed  [x]  Call bell left within reach  [x]  Nursing notified  []  Caregiver present  [x]  Bed alarm activated    COMMUNICATION/EDUCATION:   Communication/Collaboration:  [x]      Home safety education was provided and the patient/caregiver indicated understanding. [x]      Patient/family have participated as able and agree with findings and recommendations.   []      Patient is unable to participate in plan of care at this time.   Findings and recommendations were discussed with: Physical Therapist and Registered Nurse    Denise Thurman OT  Time Calculation: 21 mins

## 2019-07-11 ENCOUNTER — HOME HEALTH ADMISSION (OUTPATIENT)
Dept: HOME HEALTH SERVICES | Facility: HOME HEALTH | Age: 69
End: 2019-07-11

## 2019-07-12 DIAGNOSIS — M79.2 NEUROPATHIC PAIN: Primary | ICD-10-CM

## 2019-07-12 RX ORDER — GABAPENTIN 100 MG/1
CAPSULE ORAL
Qty: 90 CAP | Refills: 1 | Status: SHIPPED | OUTPATIENT
Start: 2019-07-12 | End: 2019-11-25 | Stop reason: SDUPTHER

## 2019-07-14 LAB
BACTERIA SPEC CULT: NORMAL
SERVICE CMNT-IMP: NORMAL

## 2019-07-19 ENCOUNTER — OFFICE VISIT (OUTPATIENT)
Dept: NEUROLOGY | Age: 69
End: 2019-07-19

## 2019-07-19 VITALS
HEIGHT: 65 IN | WEIGHT: 156 LBS | BODY MASS INDEX: 25.99 KG/M2 | OXYGEN SATURATION: 99 % | RESPIRATION RATE: 16 BRPM | HEART RATE: 69 BPM | SYSTOLIC BLOOD PRESSURE: 146 MMHG | DIASTOLIC BLOOD PRESSURE: 80 MMHG

## 2019-07-19 DIAGNOSIS — G43.009 MIGRAINE WITHOUT AURA AND WITHOUT STATUS MIGRAINOSUS, NOT INTRACTABLE: Primary | ICD-10-CM

## 2019-07-19 NOTE — PATIENT INSTRUCTIONS
[facility]   Statement to Patients  April 1, 2014      In an effort to ensure the large volume of patient prescription refills is processed in the most efficient and expeditious manner, we are asking our patients to assist us by calling your Pharmacy for all prescription refills, this will include also your  Mail Order Pharmacy. The pharmacy will contact our office electronically to continue the refill process. Please do not wait until the last minute to call your pharmacy. We need at least 48 hours (2days) to fill prescriptions. We also encourage you to call your pharmacy before going to  your prescription to make sure it is ready. With regard to controlled substance prescription refill requests (narcotic refills) that need to be picked up at our office, we ask your cooperation by providing us with at least 72 hours (3days) notice that you will need a refill. We will not refill narcotic prescription refill requests after 4:00pm on any weekday, Monday through Thursday, or after 2:00pm on Fridays, or on the weekends. We encourage everyone to explore another way of getting your prescription refill request processed using [employer], our patient web portal through our electronic medical record system. [employer] is an efficient and effective way to communicate your medication request directly to the office and  downloadable as an hong on your smart phone . [employer] also features a review functionality that allows you to view your medication list as well as leave messages for your physician. Are you ready to get connected? If so please review the attatched instructions or speak to any of our staff to get you set up right away! Thank you so much for your cooperation. Should you have any questions please contact our Practice Administrator.     The Physicians and Staff,  [facility]        Advance Directives: Care Instructions  Your Care Instructions  An advance directive is a legal way to state your wishes at the end of your life. It tells your family and your doctor what to do if you can no longer say what you want. There are two main types of advance directives. You can change them any time that your wishes change. · A living will tells your family and your doctor your wishes about life support and other treatment. · A durable power of  for health care lets you name a person to make treatment decisions for you when you can't speak for yourself. This person is called a health care agent. If you do not have an advance directive, decisions about your medical care may be made by a doctor or a  who doesn't know you. It may help to think of an advance directive as a gift to the people who care for you. If you have one, they won't have to make tough decisions by themselves. Follow-up care is a key part of your treatment and safety. Be sure to make and go to all appointments, and call your doctor if you are having problems. It's also a good idea to know your test results and keep a list of the medicines you take. How can you care for yourself at home? · Discuss your wishes with your loved ones and your doctor. This way, there are no surprises. · Many states have a unique form. Or you might use a universal form that has been approved by many states. This kind of form can sometimes be completed and stored online. Your electronic copy will then be available wherever you have a connection to the Internet. In most cases, doctors will respect your wishes even if you have a form from a different state. · You don't need a  to do an advance directive. But you may want to get legal advice. · Think about these questions when you prepare an advance directive:  ? Who do you want to make decisions about your medical care if you are not able to? Many people choose a family member or close friend.   ? Do you know enough about life support methods that might be used? If not, talk to your doctor so you understand. ? What are you most afraid of that might happen? You might be afraid of having pain, losing your independence, or being kept alive by machines. ? Where would you prefer to die? Choices include your home, a hospital, or a nursing home. ? Would you like to have information about hospice care to support you and your family? ? Do you want to donate organs when you die? ? Do you want certain Shinto practices performed before you die? If so, put your wishes in the advance directive. · Read your advance directive every year, and make changes as needed. When should you call for help? Be sure to contact your doctor if you have any questions. Where can you learn more? Go to http://filemon-alex.info/. Enter R264 in the search box to learn more about \"Advance Directives: Care Instructions. \"  Current as of: April 18, 2018  Content Version: 11.9  © 9145-2475 [employer]. Care instructions adapted under license by [employer] (which disclaims liability or warranty for this information). If you have questions about a medical condition or this instruction, always ask your healthcare professional. [place] [address] any warranty or liability for your use of this information. Patient history reviewed and patient examined. Will suggest continuation of the Topamax and is no longer on the gabapentin. Stay safe and try to avoid the falls and mishaps around the car. Is followed by several other specialist and will see her back in 6 months.

## 2019-07-19 NOTE — LETTER
7/19/19 Patient: Radha Chavez YOB: 1950 Date of Visit: 7/19/2019 Kris De La Rosa MD 
[id number] [address] VIA Facsimile: [phone] Dear Kris De La Rosa MD, Thank you for referring Ms. Radha Chavez to [facility] for evaluation. My notes for this consultation are attached. If you have questions, please do not hesitate to call me. I look forward to following your patient along with you.  
 
 
Sincerely, 
 
Cindi Ziegler MD

## 2019-07-19 NOTE — PROGRESS NOTES
Neurology Consult      Subjective:      Deana West is a 70 y.o. female who comes in today with a previously documented history of chronic migraines. Is happy on the Topamax 200 mg daily and no longer takes the gabapentin for neuropathic pain due to facial contusion following a fall. Says she had an additional fall at home where she was visually distracted and tripped over a timber near the car. Also had an issue with a state of dehydration that led to an ER encounter and evaluation. Is very satisfied with her psych management and seem to be very much alert and reactive today on all questions asked. Has socialization time with her local [employer] and I honestly did not hear anything about the stress and trauma that goes with her family relations and those interactions. I thought she was very close to her baseline and will suggest a revisit in 6 months. Did not need a refill today. Current Outpatient Medications   Medication Sig Dispense Refill    gabapentin (NEURONTIN) 100 mg capsule TAKE ONE CAPSULE BY MOUTH 3 TIMES A DAY AS NEEDED 90 Cap 1    topiramate (TOPAMAX) 200 mg tablet Take 200 mg by mouth nightly.  oxyCODONE IR (ROXICODONE) 10 mg tab immediate release tablet Take 20 mg by mouth daily.  oxyCODONE IR (ROXICODONE) 10 mg tab immediate release tablet Take 10 mg by mouth daily as needed (Midday breakthrough pain).  acetaminophen (TYLENOL EXTRA STRENGTH) 500 mg tablet Take 500 mg by mouth daily.  dicyclomine (BENTYL) 20 mg tablet Take 20 mg by mouth four (4) times daily. 1    lamoTRIgine (LAMICTAL) 150 mg tablet Take 300 mg by mouth nightly. 1    metoprolol succinate (TOPROL-XL) 50 mg XL tablet Take 50 mg by mouth daily. 0    omeprazole (PRILOSEC) 20 mg capsule Take 20 mg by mouth daily. 6    QUEtiapine (SEROQUEL) 200 mg tablet Take 400 mg by mouth nightly.  ergocalciferol (ERGOCALCIFEROL) 50,000 unit capsule Take 50,000 Units by mouth every Monday.       amLODIPine-benazepril (LOTREL) 5-10 mg per capsule Take 1 Cap by mouth daily.  gabapentin (NEURONTIN) 100 mg capsule TAKE ONE CAPSULE BY MOUTH 3 TIMES A DAY AS NEEDED 90 Cap 1    acetaminophen (TYLENOL EXTRA STRENGTH) 500 mg tablet Take 500 mg by mouth every evening.  oxyCODONE IR (ROXICODONE) 10 mg tab immediate release tablet Take 20 mg by mouth every evening.         Allergies   Allergen Reactions    Hand  [Ethyl Alcohol (Skin Cleanser)] Not Reported This Time     Past Medical History:   Diagnosis Date    [facility] ([facility] [address]     Chronic kidney disease     Hypertension     Ischemic colitis ([facility] [address] 2008    Lithium toxicity 2004    Stroke [facility]) 2004      Past Surgical History:   Procedure Laterality Date    HX ORTHOPAEDIC      back surgery 2007    HX ORTHOPAEDIC      right foot surgery 2010      Social History     Socioeconomic History    Marital status:      Spouse name: Not on file    Number of children: Not on file    Years of education: Not on file    Highest education level: Not on file   Occupational History    Not on file   Social Needs    Financial resource strain: Not on file    Food insecurity:     Worry: Not on file     Inability: Not on file    Transportation needs:     Medical: Not on file     Non-medical: Not on file   Tobacco Use    Smoking status: Former Smoker    Smokeless tobacco: Never Used   Substance and Sexual Activity    Alcohol use: No     Alcohol/week: 0.0 standard drinks    Drug use: No    Sexual activity: Yes     Partners: Male   Lifestyle    Physical activity:     Days per week: Not on file     Minutes per session: Not on file    Stress: Not on file   Relationships    Social connections:     Talks on phone: Not on file     Gets together: Not on file     Attends Hoahaoism service: Not on file     Active member of club or organization: Not on file     Attends meetings of clubs or organizations: Not on file     Relationship status: Not on file    Intimate partner violence:     Fear of current or ex partner: Not on file     Emotionally abused: Not on file     Physically abused: Not on file     Forced sexual activity: Not on file   Other Topics Concern    Not on file   Social History Narrative    Not on file      Family History   Problem Relation Age of Onset    Lung Disease Mother     Cancer Mother         Brain      Visit Vitals  /80   Pulse 69   Resp 16   Ht 5' 5\" (1.651 m)   Wt 70.8 kg (156 lb)   SpO2 99%   BMI 25.96 kg/m²        Review of Systems:   A comprehensive review of systems was negative except for that written in the HPI. Neuro Exam:     Appearance: The patient is well developed, well nourished, provides a coherent history and is in no acute distress. Mental Status: Oriented to time, place and person. Mood and affect appropriate. Cranial Nerves:   Intact visual fields. Fundi are benign. GUILLE, EOM's full, no nystagmus, no ptosis. Facial sensation is normal. Corneal reflexes are intact. Facial movement is symmetric. Hearing is normal bilaterally. Palate is midline with normal sternocleidomastoid and trapezius muscles are normal. Tongue is midline. Motor:  5/5 strength in upper and lower proximal and distal muscles. Normal bulk and tone. No fasciculations. Reflexes:   Deep tendon reflexes 2+/4 and symmetrical.   Sensory:   Normal to touch, pinprick and vibration. Gait:  Normal gait. Tremor:   No tremor noted. Cerebellar:  No cerebellar signs present. Neurovascular:  Normal heart sounds and regular rhythm, peripheral pulses intact, and no carotid bruits. Assessment:   Migraine headaches. Doing very reasonably well at this time on Topamax 200 mg daily. Hopefully can avoid any additional falls that she is taken at home especially around the car. Plan:   Revisit 6 months.   Signed by :  Lissa Cota MD

## 2020-02-10 ENCOUNTER — OFFICE VISIT (OUTPATIENT)
Dept: NEUROLOGY | Age: 70
End: 2020-02-10

## 2020-02-10 VITALS
SYSTOLIC BLOOD PRESSURE: 138 MMHG | HEART RATE: 97 BPM | OXYGEN SATURATION: 98 % | BODY MASS INDEX: 26.73 KG/M2 | HEIGHT: 65 IN | WEIGHT: 160.4 LBS | RESPIRATION RATE: 22 BRPM | DIASTOLIC BLOOD PRESSURE: 78 MMHG

## 2020-02-10 DIAGNOSIS — G43.009 MIGRAINE WITHOUT AURA AND WITHOUT STATUS MIGRAINOSUS, NOT INTRACTABLE: Primary | ICD-10-CM

## 2020-02-10 NOTE — PROGRESS NOTES
Recurrent migraine headaches. Neurology Consult      Subjective:      Mary Boyce is a 78 y.o. female Who comes in today with chronic migraine headache follow-up. Headaches sound manageable and she is on 200 mg of Topamax daily. Had some pressure of speech and was multidirectional with the topics she talked abou, 1 of which was some concerns about her clarity of thought and the other was what sounds like linked dependent sensory changes in the distal legs. Says her insurance will not be finalized with coverage until July 7? Does not want a face any untoward or new adventures or discoveries until that date? I will try to remember on that particular encounter in July with revisit the subject matter that was brought up randomly today. Denies that she has diabetes, although I quickly looked onto her lab tab and she has some blood sugars that range up near the 120 range on occasion. Prediabetes? Will pursue further on revisit. Could include supportive labs and an EMG and nerve conduction of both legs in addition to possible memory work-up depending on how things are going at that time. Current Outpatient Medications   Medication Sig Dispense Refill    gabapentin (NEURONTIN) 100 mg capsule 1 po tid prn 90 Cap 2    topiramate (TOPAMAX) 200 mg tablet TAKE 1 TABLET BY MOUTH EVERY DAY 90 Tab 1    oxyCODONE IR (ROXICODONE) 10 mg tab immediate release tablet Take 20 mg by mouth daily.  acetaminophen (TYLENOL EXTRA STRENGTH) 500 mg tablet Take 500 mg by mouth daily.  acetaminophen (TYLENOL EXTRA STRENGTH) 500 mg tablet Take 500 mg by mouth every evening.  dicyclomine (BENTYL) 20 mg tablet Take 20 mg by mouth four (4) times daily. 1    lamoTRIgine (LAMICTAL) 150 mg tablet Take 300 mg by mouth nightly. 1    metoprolol succinate (TOPROL-XL) 50 mg XL tablet Take 50 mg by mouth daily. 0    omeprazole (PRILOSEC) 20 mg capsule Take 20 mg by mouth daily.   6    QUEtiapine (SEROQUEL) 200 mg tablet Take 400 mg by mouth nightly.  ergocalciferol (ERGOCALCIFEROL) 50,000 unit capsule Take 50,000 Units by mouth every Monday.  amLODIPine-benazepril (LOTREL) 5-10 mg per capsule Take 1 Cap by mouth daily.  topiramate (TOPAMAX) 200 mg tablet Take 200 mg by mouth nightly.  oxyCODONE IR (ROXICODONE) 10 mg tab immediate release tablet Take 10 mg by mouth daily as needed (Midday breakthrough pain).  oxyCODONE IR (ROXICODONE) 10 mg tab immediate release tablet Take 20 mg by mouth every evening.         Allergies   Allergen Reactions    Hand  [Ethyl Alcohol (Skin Cleanser)] Not Reported This Time     Past Medical History:   Diagnosis Date    [facility] [address]     Chronic kidney disease     Hypertension     Ischemic colitis ([facility] [address] 2008    Lithium toxicity 2004    Stroke [facility]) 2004      Past Surgical History:   Procedure Laterality Date    HX ORTHOPAEDIC      back surgery 2007    HX ORTHOPAEDIC      right foot surgery 2010      Social History     Socioeconomic History    Marital status:      Spouse name: Not on file    Number of children: Not on file    Years of education: Not on file    Highest education level: Not on file   Occupational History    Not on file   Social Needs    Financial resource strain: Not on file    Food insecurity:     Worry: Not on file     Inability: Not on file    Transportation needs:     Medical: Not on file     Non-medical: Not on file   Tobacco Use    Smoking status: Former Smoker    Smokeless tobacco: Never Used   Substance and Sexual Activity    Alcohol use: No     Alcohol/week: 0.0 standard drinks    Drug use: No    Sexual activity: Yes     Partners: Male   Lifestyle    Physical activity:     Days per week: Not on file     Minutes per session: Not on file    Stress: Not on file   Relationships    Social connections:     Talks on phone: Not on file     Gets together: Not on file     Attends Worship service: Not on file Active member of club or organization: Not on file     Attends meetings of clubs or organizations: Not on file     Relationship status: Not on file    Intimate partner violence:     Fear of current or ex partner: Not on file     Emotionally abused: Not on file     Physically abused: Not on file     Forced sexual activity: Not on file   Other Topics Concern    Not on file   Social History Narrative    Not on file      Family History   Problem Relation Age of Onset    Lung Disease Mother     Cancer Mother         Brain      Visit Vitals  /78   Pulse 97   Resp 22   Ht 5' 5\" (1.651 m)   Wt 72.8 kg (160 lb 6.4 oz)   SpO2 98%   BMI 26.69 kg/m²        Review of Systems:   A comprehensive review of systems was negative except for that written in the HPI. Neuro Exam:     Appearance: The patient is well developed, well nourished, provides a coherent history and is in no acute distress. I did notice some pressure of speech in the course of conversation and occasionally did some point-to-point quick referencing on different subject matter. Mental Status: Oriented to time, place and person. Mood and affect appropriate. Cranial Nerves:   Intact visual fields. Fundi are benign. GUILLE, EOM's full, no nystagmus, no ptosis. Facial sensation is normal. Corneal reflexes are intact. Facial movement is symmetric. Hearing is normal bilaterally. Palate is midline with normal sternocleidomastoid and trapezius muscles are normal. Tongue is midline. Motor:  5/5 strength in upper and lower proximal and distal muscles. Normal bulk and tone. No fasciculations. Reflexes:   Deep tendon reflexes 0-2+/4 and symmetrical.   Sensory:    Diminished distally to touch, pinprick and vibration. Gait:  Normal gait. Tremor:   No tremor noted. Cerebellar:  No cerebellar signs present. Neurovascular:  Normal heart sounds and regular rhythm, peripheral pulses intact, and no carotid bruits.             Assessment:   Recurrent migraine headaches. We will continue the Topamax 200 mg daily and did not need a refill today. 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 Also said that at times her memory registration is not as efficient as it used to be. Did not want to get into these areas of additional research because she says her insurance will not be finalized till July 7. Plan:   Revisit 6 months.   Signed by :  Selby Seip MD

## 2020-02-10 NOTE — PATIENT INSTRUCTIONS
Patient history reviewed patient examined. Will continue on the Topamax and did not need a refill today. On next visit we can review the memory and what sounds like a possible length dependent neuropathy in the legs. Apparently will have a settled insurance coverage in place by July 7.

## 2020-02-10 NOTE — LETTER
2/10/20 Patient: Alea Adan YOB: 1950 Date of Visit: 2/10/2020 Dat Cool MD 
[address] [facility] [facility] [address] VIA Facsimile: [phone] Dear Dat Cool MD, Thank you for referring Ms. Alea Adan to [facility] for evaluation. My notes for this consultation are attached. If you have questions, please do not hesitate to call me. I look forward to following your patient along with you.  
 
 
Sincerely, 
 
Jamesetta Ahumada, MD

## 2020-03-20 RX ORDER — TOPIRAMATE 200 MG/1
TABLET ORAL
Qty: 90 TAB | Refills: 1 | Status: SHIPPED | OUTPATIENT
Start: 2020-03-20 | End: 2020-09-12

## 2020-09-24 ENCOUNTER — OFFICE VISIT (OUTPATIENT)
Dept: NEUROLOGY | Age: 70
End: 2020-09-24
Payer: MEDICARE

## 2020-09-24 VITALS
SYSTOLIC BLOOD PRESSURE: 138 MMHG | TEMPERATURE: 97.6 F | BODY MASS INDEX: 26.69 KG/M2 | HEIGHT: 65 IN | HEART RATE: 78 BPM | DIASTOLIC BLOOD PRESSURE: 74 MMHG | OXYGEN SATURATION: 99 % | RESPIRATION RATE: 20 BRPM

## 2020-09-24 DIAGNOSIS — G43.009 MIGRAINE WITHOUT AURA AND WITHOUT STATUS MIGRAINOSUS, NOT INTRACTABLE: Primary | ICD-10-CM

## 2020-09-24 DIAGNOSIS — M79.2 NEUROPATHIC PAIN: ICD-10-CM

## 2020-09-24 PROCEDURE — 1100F PTFALLS ASSESS-DOCD GE2>/YR: CPT | Performed by: SPECIALIST

## 2020-09-24 PROCEDURE — G8754 DIAS BP LESS 90: HCPCS | Performed by: SPECIALIST

## 2020-09-24 PROCEDURE — 3017F COLORECTAL CA SCREEN DOC REV: CPT | Performed by: SPECIALIST

## 2020-09-24 PROCEDURE — G8400 PT W/DXA NO RESULTS DOC: HCPCS | Performed by: SPECIALIST

## 2020-09-24 PROCEDURE — 99213 OFFICE O/P EST LOW 20 MIN: CPT | Performed by: SPECIALIST

## 2020-09-24 PROCEDURE — G8419 CALC BMI OUT NRM PARAM NOF/U: HCPCS | Performed by: SPECIALIST

## 2020-09-24 PROCEDURE — 1090F PRES/ABSN URINE INCON ASSESS: CPT | Performed by: SPECIALIST

## 2020-09-24 PROCEDURE — G8536 NO DOC ELDER MAL SCRN: HCPCS | Performed by: SPECIALIST

## 2020-09-24 PROCEDURE — G8427 DOCREV CUR MEDS BY ELIG CLIN: HCPCS | Performed by: SPECIALIST

## 2020-09-24 PROCEDURE — G9717 DOC PT DX DEP/BP F/U NT REQ: HCPCS | Performed by: SPECIALIST

## 2020-09-24 PROCEDURE — 3288F FALL RISK ASSESSMENT DOCD: CPT | Performed by: SPECIALIST

## 2020-09-24 PROCEDURE — G8752 SYS BP LESS 140: HCPCS | Performed by: SPECIALIST

## 2020-09-24 RX ORDER — GABAPENTIN 100 MG/1
CAPSULE ORAL
Qty: 270 CAP | Refills: 1 | Status: SHIPPED | OUTPATIENT
Start: 2020-09-24 | End: 2021-05-18

## 2020-09-24 RX ORDER — TOPIRAMATE 200 MG/1
200 TABLET ORAL
Qty: 90 TAB | Refills: 1 | Status: SHIPPED | OUTPATIENT
Start: 2020-09-24 | End: 2021-03-28

## 2020-09-24 RX ORDER — DENOSUMAB 60 MG/ML
60 INJECTION SUBCUTANEOUS
COMMUNITY

## 2020-09-24 NOTE — PATIENT INSTRUCTIONS
Patient history reviewed patient examined. Will renew medicines by request and hopefully the left leg boot apparatus will come off soon enough. Good luck with upcoming physical therapy. Revisit in 6 months.

## 2020-09-24 NOTE — LETTER
9/24/20 Patient: Tashi Linn YOB: 1950 Date of Visit: 9/24/2020 Tawanna Spencer MD 
[id number] [facility] [address] [facility] [address] VIA Facsimile: [phone] Dear Tawanna Spencer MD, Thank you for referring Ms. Tashi Linn to [address] [place] [address] for evaluation. My notes for this consultation are attached. If you have questions, please do not hesitate to call me. I look forward to following your patient along with you.  
 
 
Sincerely, 
 
Tamar Monsivais MD
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
## 2021-04-02 ENCOUNTER — PATIENT MESSAGE (OUTPATIENT)
Dept: NEUROLOGY | Age: 71
End: 2021-04-02

## 2021-04-27 DIAGNOSIS — G43.009 MIGRAINE WITHOUT AURA AND WITHOUT STATUS MIGRAINOSUS, NOT INTRACTABLE: Primary | ICD-10-CM

## 2021-05-27 ENCOUNTER — OFFICE VISIT (OUTPATIENT)
Dept: NEUROLOGY | Age: 71
End: 2021-05-27
Payer: MEDICARE

## 2021-05-27 VITALS
WEIGHT: 157 LBS | OXYGEN SATURATION: 98 % | DIASTOLIC BLOOD PRESSURE: 72 MMHG | SYSTOLIC BLOOD PRESSURE: 112 MMHG | HEART RATE: 86 BPM | BODY MASS INDEX: 26.16 KG/M2 | HEIGHT: 65 IN | RESPIRATION RATE: 18 BRPM

## 2021-05-27 DIAGNOSIS — M79.2 NEUROPATHIC PAIN: ICD-10-CM

## 2021-05-27 DIAGNOSIS — G43.009 MIGRAINE WITHOUT AURA AND WITHOUT STATUS MIGRAINOSUS, NOT INTRACTABLE: Primary | ICD-10-CM

## 2021-05-27 PROCEDURE — 99213 OFFICE O/P EST LOW 20 MIN: CPT | Performed by: SPECIALIST

## 2021-05-27 PROCEDURE — G8419 CALC BMI OUT NRM PARAM NOF/U: HCPCS | Performed by: SPECIALIST

## 2021-05-27 PROCEDURE — 1100F PTFALLS ASSESS-DOCD GE2>/YR: CPT | Performed by: SPECIALIST

## 2021-05-27 PROCEDURE — G8536 NO DOC ELDER MAL SCRN: HCPCS | Performed by: SPECIALIST

## 2021-05-27 PROCEDURE — G8400 PT W/DXA NO RESULTS DOC: HCPCS | Performed by: SPECIALIST

## 2021-05-27 PROCEDURE — 3288F FALL RISK ASSESSMENT DOCD: CPT | Performed by: SPECIALIST

## 2021-05-27 PROCEDURE — G8427 DOCREV CUR MEDS BY ELIG CLIN: HCPCS | Performed by: SPECIALIST

## 2021-05-27 PROCEDURE — G8752 SYS BP LESS 140: HCPCS | Performed by: SPECIALIST

## 2021-05-27 PROCEDURE — 3017F COLORECTAL CA SCREEN DOC REV: CPT | Performed by: SPECIALIST

## 2021-05-27 PROCEDURE — G9717 DOC PT DX DEP/BP F/U NT REQ: HCPCS | Performed by: SPECIALIST

## 2021-05-27 PROCEDURE — 1090F PRES/ABSN URINE INCON ASSESS: CPT | Performed by: SPECIALIST

## 2021-05-27 PROCEDURE — G8754 DIAS BP LESS 90: HCPCS | Performed by: SPECIALIST

## 2021-05-27 NOTE — LETTER
5/27/2021 Patient: Marilia Booth YOB: 1950 Date of Visit: 5/27/2021 Flavia Curran MD 
[address] [facility]. [address] [facility] [address] Via Fax: [phone] Dear Flavia Curran MD, Thank you for referring Ms. Marilia Booth to [facility] for evaluation. My notes for this consultation are attached. If you have questions, please do not hesitate to call me. I look forward to following your patient along with you.  
 
 
Sincerely, 
 
Lindsay Fritz MD

## 2021-05-27 NOTE — PROGRESS NOTES
Chief Complaint   Patient presents with    Follow-up     migraines; at least 15 migraines in last 30 days     1. Have you been to the ER, urgent care clinic since your last visit? Hospitalized since your last visit? No    2. Have you seen or consulted any other health care providers outside of the [address] [place] [address] since your last visit? Include any pap smears or colon screening.  No    Visit Vitals  /72   Pulse 86   Resp 18   Ht 5' 5\" (1.651 m)   Wt 71.2 kg (157 lb)   SpO2 98%   BMI 26.13 kg/m²

## 2021-05-27 NOTE — PROGRESS NOTES
Neurology Consult      Subjective:      Azam Bergman is a 69 y.o. female who comes in today with background migraines on Topamax 200 mg and gabapentin is a type of transition drug 100 mg 3 times daily with multilevel degenerative joint and disc disease it probably helps improve her headache control as well. Has multilevel degenerative joint disc disease C3-4 through C5-6 and says it has gotten incredibly uncomfortable in recent times. Is very much interested in referral to pain management and we will go ahead and make that referral.  Keeps up a great attitude and tries to be as self-sufficient as possible. I will see her back in 6 months which is her usual and customary revisit timeframe. Current Outpatient Medications   Medication Sig Dispense Refill    gabapentin (NEURONTIN) 100 mg capsule TAKE 1 CAPSULE BY MOUTH THREE TIMES A DAY AS NEEDED 90 Cap 1    topiramate (TOPAMAX) 200 mg tablet TAKE 1 TABLET BY MOUTH EVERY DAY AT NIGHT 30 Tab 1    denosumab (Prolia) 60 mg/mL injection 60 mg by SubCUTAneous route.  oxyCODONE IR (ROXICODONE) 10 mg tab immediate release tablet Take 10 mg by mouth daily as needed (Midday breakthrough pain).  dicyclomine (BENTYL) 20 mg tablet Take 20 mg by mouth four (4) times daily. 1    lamoTRIgine (LAMICTAL) 150 mg tablet Take 300 mg by mouth nightly. 1    metoprolol succinate (TOPROL-XL) 50 mg XL tablet Take 50 mg by mouth daily. 0    omeprazole (PRILOSEC) 20 mg capsule Take 20 mg by mouth daily. 6    QUEtiapine (SEROQUEL) 200 mg tablet Take 400 mg by mouth nightly.  ergocalciferol (ERGOCALCIFEROL) 50,000 unit capsule Take 50,000 Units by mouth every Monday.  amLODIPine-benazepril (LOTREL) 5-10 mg per capsule Take 1 Cap by mouth daily.         topiramate (TOPAMAX) 200 mg tablet TAKE 1 TABLET BY MOUTH EVERY DAY (Patient not taking: Reported on 5/27/2021) 30 Tab 1    oxyCODONE IR (ROXICODONE) 10 mg tab immediate release tablet Take 20 mg by mouth daily. (Patient not taking: Reported on 5/27/2021)      acetaminophen (TYLENOL EXTRA STRENGTH) 500 mg tablet Take 500 mg by mouth daily. (Patient not taking: Reported on 5/27/2021)      acetaminophen (TYLENOL EXTRA STRENGTH) 500 mg tablet Take 500 mg by mouth every evening. (Patient not taking: Reported on 5/27/2021)      oxyCODONE IR (ROXICODONE) 10 mg tab immediate release tablet Take 20 mg by mouth every evening. (Patient not taking: Reported on 5/27/2021)        Allergies   Allergen Reactions    Hand  [Ethyl Alcohol (Skin Cleanser)] Not Reported This Time     Past Medical History:   Diagnosis Date    [facility] ([facility] [address]     Chronic kidney disease     Hypertension     Ischemic colitis ([facility] [address] 2008    Lithium toxicity 2004    [facility]roke [facility]) 2004      Past Surgical History:   Procedure Laterality Date    HX ORTHOPAEDIC      back surgery 2007    HX ORTHOPAEDIC      right foot surgery 2010      Social History     Socioeconomic History    Marital status:      Spouse name: Not on file    Number of children: Not on file    Years of education: Not on file    Highest education level: Not on file   Occupational History    Not on file   Tobacco Use    Smoking status: Former Smoker    Smokeless tobacco: Never Used   Substance and Sexual Activity    Alcohol use: No     Alcohol/week: 0.0 standard drinks    Drug use: No    Sexual activity: Yes     Partners: Male   Other Topics Concern    Not on file   Social History Narrative    Not on file     Social Determinants of Health     Financial Resource Strain:     Difficulty of Paying Living Expenses:    Food Insecurity:     Worried About Running Out of Food in the Last Year:     [address] in the Last Year:    Transportation Needs:     Lack of Transportation (Medical):      Lack of Transportation (Non-Medical):    Physical Activity:     Days of Exercise per Week:     Minutes of Exercise per Session:    Stress:     Feeling of Stress :    Social Connections:     Frequency of Communication with Friends and Family:     Frequency of Social Gatherings with Friends and Family:     Attends Confucianism Services:     Active Member of Clubs or Organizations:     Attends Club or Organization Meetings:     Marital Status:    Intimate Partner Violence:     Fear of Current or Ex-Partner:     Emotionally Abused:     Physically Abused:     Sexually Abused:       Family History   Problem Relation Age of Onset    Lung Disease Mother     Cancer Mother         Brain      Visit Vitals  /72   Pulse 86   Resp 18   Ht 5' 5\" (1.651 m)   Wt 71.2 kg (157 lb)   SpO2 98%   BMI 26.13 kg/m²        Review of Systems:   A comprehensive review of systems was negative except for that written in the HPI. Neuro Exam:     Appearance: The patient is well developed, well nourished, provides a coherent history and is in no acute distress. Mental Status: Oriented to time, place and person. Mood and affect appropriate. Cranial Nerves:   Intact visual fields. Fundi are benign. GUILLE, EOM's full, no nystagmus, no ptosis. Facial sensation is normal. Corneal reflexes are intact. Facial movement is symmetric. Hearing is normal bilaterally. Palate is midline with normal sternocleidomastoid and trapezius muscles are normal. Tongue is midline. Motor:  5/5 strength in upper and lower proximal and distal muscles. Normal bulk and tone. No fasciculations. Reflexes:   Deep tendon reflexes 2+/4 and symmetrical.   Sensory:   Normal to touch, pinprick and vibration. Gait:  Normal gait. Tremor:   No tremor noted. Cerebellar:  No cerebellar signs present. Neurovascular:  Normal heart sounds and regular rhythm, peripheral pulses intact, and no carotid bruits. Assessment:   Problem 1 migraines.   Does not need refill on her Topamax 200 mg and gabapentin is a transition drug between her headache complaint and the degenerative joint disc disease in her neck which enhances her headaches. Problem 2 cervicalgia with multilevel degenerative joint disc disease. Will refer to pain management as she is distinctly uncomfortable. Plan:   Revisit 6 months.   Signed by :  Eva Tracy MD

## 2021-05-27 NOTE — PATIENT INSTRUCTIONS
Patient history reviewed patient examined. We will keep gabapentin and Topamax in place as is and will refer to pain management on her multilevel degenerative joint disc disease in the neck. Hopefully some peace of mind and comfort will ensue. Revisit here 6 months.

## 2021-06-15 ENCOUNTER — TELEPHONE (OUTPATIENT)
Dept: NEUROLOGY | Age: 71
End: 2021-06-15

## 2021-06-15 NOTE — TELEPHONE ENCOUNTER
----- Message from Arabella Patel sent at 6/14/2021  1:52 PM EDT -----  Regarding: Dr. Tashi Cooper Telephone    Caller's first and last name: N/A  Reason for call: Questions   Best contact number(s): [phone]  Details to clarify the request: Pt stated that she was supposed to receive a phone call to schedule an appointment at pain management. Pt stated that she never received a phone call and needs a call back as soon as possible.

## 2021-06-25 NOTE — TELEPHONE ENCOUNTER
LVM. Referral and pt info sent to [address] [facility]. Fax confirmation received.

## 2021-07-09 ENCOUNTER — TRANSCRIBE ORDER (OUTPATIENT)
Dept: NEUROLOGY | Age: 71
End: 2021-07-09

## 2021-07-09 NOTE — TELEPHONE ENCOUNTER
----- Message from Alden Gupta sent at 7/9/2021  1:08 PM EDT -----  Regarding: DARYL/TELEPHONE  Contact: [phone]  Medication Refill    Caller (if not patient): N/A      Relationship of caller (if not patient): N/A      Best contact number(s): [phone]        Name of medication and dosage if known:Topamax 200 mg 90       Is patient out of this medication (yes/no): Yes      Pharmacy name: [facility]    Pharmacy listed in chart? (yes/no): Yes    Pharmacy phone number: [phone]      Details to clarify the request: Refill request for Topamax 200 mg.. Per patient request was fax by pharmacy.       Alden Gupta

## 2021-07-12 RX ORDER — TOPIRAMATE 200 MG/1
TABLET ORAL
Qty: 30 TABLET | Refills: 4 | Status: SHIPPED | OUTPATIENT
Start: 2021-07-12 | End: 2021-07-26

## 2021-07-26 DIAGNOSIS — G43.009 MIGRAINE WITHOUT AURA AND WITHOUT STATUS MIGRAINOSUS, NOT INTRACTABLE: ICD-10-CM

## 2021-07-26 RX ORDER — TOPIRAMATE 200 MG/1
TABLET ORAL
Qty: 90 TABLET | Refills: 1 | Status: SHIPPED | OUTPATIENT
Start: 2021-07-26 | End: 2022-03-16

## 2021-07-26 RX ORDER — TOPIRAMATE 200 MG/1
TABLET ORAL
Qty: 30 TABLET | Refills: 1 | Status: SHIPPED | OUTPATIENT
Start: 2021-07-26 | End: 2022-09-12 | Stop reason: SDUPTHER

## 2021-10-24 DIAGNOSIS — M79.2 NEUROPATHIC PAIN: ICD-10-CM

## 2021-10-25 RX ORDER — GABAPENTIN 100 MG/1
CAPSULE ORAL
Qty: 90 CAPSULE | Refills: 1 | Status: SHIPPED | OUTPATIENT
Start: 2021-10-25 | End: 2022-04-27

## 2021-12-21 ENCOUNTER — APPOINTMENT (OUTPATIENT)
Dept: GENERAL RADIOLOGY | Age: 71
End: 2021-12-21
Attending: NURSE PRACTITIONER
Payer: MEDICARE

## 2021-12-21 ENCOUNTER — APPOINTMENT (OUTPATIENT)
Dept: CT IMAGING | Age: 71
End: 2021-12-21
Attending: NURSE PRACTITIONER
Payer: MEDICARE

## 2021-12-21 ENCOUNTER — HOSPITAL ENCOUNTER (EMERGENCY)
Age: 71
Discharge: HOME | End: 2021-12-21
Attending: STUDENT IN AN ORGANIZED HEALTH CARE EDUCATION/TRAINING PROGRAM
Payer: MEDICARE

## 2021-12-21 VITALS
SYSTOLIC BLOOD PRESSURE: 151 MMHG | HEIGHT: 65 IN | OXYGEN SATURATION: 99 % | DIASTOLIC BLOOD PRESSURE: 81 MMHG | RESPIRATION RATE: 16 BRPM | HEART RATE: 81 BPM | BODY MASS INDEX: 24.16 KG/M2 | TEMPERATURE: 96.8 F | WEIGHT: 145 LBS
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
## 2021-12-21 DIAGNOSIS — S62.306A CLOSED NONDISPLACED FRACTURE OF FIFTH METACARPAL BONE OF RIGHT HAND, UNSPECIFIED PORTION OF METACARPAL, INITIAL ENCOUNTER: ICD-10-CM

## 2021-12-21 PROCEDURE — 99283 EMERGENCY DEPT VISIT LOW MDM: CPT

## 2021-12-21 PROCEDURE — 73110 X-RAY EXAM OF WRIST: CPT

## 2021-12-21 PROCEDURE — 70450 CT HEAD/BRAIN W/O DYE: CPT
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
## 2022-03-14 ENCOUNTER — OFFICE VISIT (OUTPATIENT)
Dept: NEUROLOGY | Age: 72
End: 2022-03-14
Payer: MEDICARE

## 2022-03-14 VITALS
HEIGHT: 65 IN | SYSTOLIC BLOOD PRESSURE: 120 MMHG | DIASTOLIC BLOOD PRESSURE: 82 MMHG | TEMPERATURE: 94.7 F | BODY MASS INDEX: 25.02 KG/M2 | OXYGEN SATURATION: 95 % | WEIGHT: 150.2 LBS | HEART RATE: 81 BPM

## 2022-03-14 DIAGNOSIS — M54.2 CERVICALGIA: ICD-10-CM

## 2022-03-14 DIAGNOSIS — G43.009 MIGRAINE WITHOUT AURA AND WITHOUT STATUS MIGRAINOSUS, NOT INTRACTABLE: Primary | ICD-10-CM

## 2022-03-14 PROCEDURE — G8754 DIAS BP LESS 90: HCPCS | Performed by: SPECIALIST

## 2022-03-14 PROCEDURE — G8420 CALC BMI NORM PARAMETERS: HCPCS | Performed by: SPECIALIST

## 2022-03-14 PROCEDURE — G8752 SYS BP LESS 140: HCPCS | Performed by: SPECIALIST

## 2022-03-14 PROCEDURE — G9717 DOC PT DX DEP/BP F/U NT REQ: HCPCS | Performed by: SPECIALIST

## 2022-03-14 PROCEDURE — 3017F COLORECTAL CA SCREEN DOC REV: CPT | Performed by: SPECIALIST

## 2022-03-14 PROCEDURE — 99213 OFFICE O/P EST LOW 20 MIN: CPT | Performed by: SPECIALIST

## 2022-03-14 PROCEDURE — G8400 PT W/DXA NO RESULTS DOC: HCPCS | Performed by: SPECIALIST

## 2022-03-14 PROCEDURE — G8427 DOCREV CUR MEDS BY ELIG CLIN: HCPCS | Performed by: SPECIALIST

## 2022-03-14 PROCEDURE — 1101F PT FALLS ASSESS-DOCD LE1/YR: CPT | Performed by: SPECIALIST

## 2022-03-14 PROCEDURE — G8536 NO DOC ELDER MAL SCRN: HCPCS | Performed by: SPECIALIST

## 2022-03-14 PROCEDURE — 1090F PRES/ABSN URINE INCON ASSESS: CPT | Performed by: SPECIALIST
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
## 2022-03-16 DIAGNOSIS — G43.009 MIGRAINE WITHOUT AURA AND WITHOUT STATUS MIGRAINOSUS, NOT INTRACTABLE: ICD-10-CM

## 2022-03-16 RX ORDER — TOPIRAMATE 200 MG/1
TABLET ORAL
Qty: 90 TABLET | Refills: 1 | Status: SHIPPED | OUTPATIENT
Start: 2022-03-16 | End: 2022-09-30

## 2022-03-18 PROBLEM — I10 HTN (HYPERTENSION): Status: ACTIVE | Noted: 2019-07-09

## 2022-03-20 PROBLEM — G93.40 ACUTE ENCEPHALOPATHY: Status: ACTIVE | Noted: 2019-07-10

## 2022-09-12 ENCOUNTER — OFFICE VISIT (OUTPATIENT)
Dept: NEUROLOGY | Age: 72
End: 2022-09-12
Payer: MEDICARE

## 2022-09-12 VITALS — OXYGEN SATURATION: 99 % | DIASTOLIC BLOOD PRESSURE: 66 MMHG | SYSTOLIC BLOOD PRESSURE: 114 MMHG | HEART RATE: 69 BPM

## 2022-09-12 DIAGNOSIS — M79.2 NEUROPATHIC PAIN: Primary | ICD-10-CM

## 2022-09-12 DIAGNOSIS — G44.221 CHRONIC TENSION-TYPE HEADACHE, INTRACTABLE: ICD-10-CM

## 2022-09-12 PROCEDURE — 1090F PRES/ABSN URINE INCON ASSESS: CPT | Performed by: SPECIALIST

## 2022-09-12 PROCEDURE — 1101F PT FALLS ASSESS-DOCD LE1/YR: CPT | Performed by: SPECIALIST

## 2022-09-12 PROCEDURE — 1123F ACP DISCUSS/DSCN MKR DOCD: CPT | Performed by: SPECIALIST

## 2022-09-12 PROCEDURE — G8420 CALC BMI NORM PARAMETERS: HCPCS | Performed by: SPECIALIST

## 2022-09-12 PROCEDURE — 99213 OFFICE O/P EST LOW 20 MIN: CPT | Performed by: SPECIALIST

## 2022-09-12 PROCEDURE — G8754 DIAS BP LESS 90: HCPCS | Performed by: SPECIALIST

## 2022-09-12 PROCEDURE — G8752 SYS BP LESS 140: HCPCS | Performed by: SPECIALIST

## 2022-09-12 PROCEDURE — G8427 DOCREV CUR MEDS BY ELIG CLIN: HCPCS | Performed by: SPECIALIST

## 2022-09-12 PROCEDURE — G8536 NO DOC ELDER MAL SCRN: HCPCS | Performed by: SPECIALIST

## 2022-09-12 PROCEDURE — G8400 PT W/DXA NO RESULTS DOC: HCPCS | Performed by: SPECIALIST

## 2022-09-12 PROCEDURE — 3017F COLORECTAL CA SCREEN DOC REV: CPT | Performed by: SPECIALIST

## 2022-09-12 PROCEDURE — G9717 DOC PT DX DEP/BP F/U NT REQ: HCPCS | Performed by: SPECIALIST
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
## 2022-09-12 NOTE — LETTER
9/12/2022    Patient: Phill Colin   YOB: 1950   Date of Visit: 9/12/2022     JUN Ibanez  [address] [place] [address]  Via Fax: [phone]    Dear JUN Ibanez,      Thank you for referring Ms. Phill Colin to [facility] for evaluation. My notes for this consultation are attached. If you have questions, please do not hesitate to call me. I look forward to following your patient along with you.       Sincerely,    Abebe Medina MD

## 2022-09-12 NOTE — PATIENT INSTRUCTIONS
Patient history viewed patient examined. Will suggest continuation of the Topamax and gabapentin as written. I do not think migraine rescue would have any place in the current headache history as I agree with the patient it sounds like 100% of the headaches are tension and stress related. Said she had seen somebody about her left foot injury and that I would need to see the information about that injury as it would relate to a \"neuropathy\"? Otherwise revisit in 6 months.

## 2022-09-12 NOTE — PROGRESS NOTES
Migraine have been doing poorly, high stress  Monthly couldn't say how many    Pt said she hasn't been diagnosed with neuropathy

## 2022-09-30 DIAGNOSIS — G43.009 MIGRAINE WITHOUT AURA AND WITHOUT STATUS MIGRAINOSUS, NOT INTRACTABLE: ICD-10-CM

## 2022-09-30 RX ORDER — TOPIRAMATE 200 MG/1
TABLET ORAL
Qty: 90 TABLET | Refills: 1 | Status: SHIPPED | OUTPATIENT
Start: 2022-09-30

## 2022-11-04 ENCOUNTER — HOSPITAL ENCOUNTER (OUTPATIENT)
Dept: GENERAL RADIOLOGY | Age: 72
Discharge: HOME | End: 2022-11-04
Payer: MEDICARE

## 2022-11-04 ENCOUNTER — TRANSCRIBE ORDER (OUTPATIENT)
Dept: REGISTRATION | Age: 72
End: 2022-11-04

## 2022-11-04 DIAGNOSIS — R10.9 RIGHT SIDED ABDOMINAL PAIN: ICD-10-CM

## 2022-11-04 DIAGNOSIS — R10.9 RIGHT SIDED ABDOMINAL PAIN: Primary | ICD-10-CM

## 2022-11-04 PROCEDURE — 74018 RADEX ABDOMEN 1 VIEW: CPT

## 2023-01-09 ENCOUNTER — OFFICE VISIT (OUTPATIENT)
Dept: OBGYN CLINIC | Age: 73
End: 2023-01-09
Payer: MEDICARE

## 2023-01-09 VITALS — SYSTOLIC BLOOD PRESSURE: 169 MMHG | DIASTOLIC BLOOD PRESSURE: 93 MMHG | WEIGHT: 156 LBS | BODY MASS INDEX: 25.96 KG/M2

## 2023-01-09 DIAGNOSIS — Z01.419 WELL WOMAN EXAM WITH ROUTINE GYNECOLOGICAL EXAM: Primary | ICD-10-CM

## 2023-01-09 DIAGNOSIS — N89.8 VAGINAL ITCHING: ICD-10-CM

## 2023-01-09 DIAGNOSIS — Z01.419 WELL WOMAN EXAM WITH ROUTINE GYNECOLOGICAL EXAM: ICD-10-CM

## 2023-01-09 PROCEDURE — G9717 DOC PT DX DEP/BP F/U NT REQ: HCPCS | Performed by: OBSTETRICS & GYNECOLOGY

## 2023-01-09 PROCEDURE — 3080F DIAST BP >= 90 MM HG: CPT | Performed by: OBSTETRICS & GYNECOLOGY

## 2023-01-09 PROCEDURE — 1101F PT FALLS ASSESS-DOCD LE1/YR: CPT | Performed by: OBSTETRICS & GYNECOLOGY

## 2023-01-09 PROCEDURE — 1090F PRES/ABSN URINE INCON ASSESS: CPT | Performed by: OBSTETRICS & GYNECOLOGY

## 2023-01-09 PROCEDURE — G0101 CA SCREEN;PELVIC/BREAST EXAM: HCPCS | Performed by: OBSTETRICS & GYNECOLOGY

## 2023-01-09 PROCEDURE — 3017F COLORECTAL CA SCREEN DOC REV: CPT | Performed by: OBSTETRICS & GYNECOLOGY

## 2023-01-09 PROCEDURE — G8417 CALC BMI ABV UP PARAM F/U: HCPCS | Performed by: OBSTETRICS & GYNECOLOGY

## 2023-01-09 PROCEDURE — 3077F SYST BP >= 140 MM HG: CPT | Performed by: OBSTETRICS & GYNECOLOGY

## 2023-01-09 NOTE — PROGRESS NOTES
Josefina Guerin is a 67 y.o. female returns for an annual exam     Chief Complaint   Patient presents with    Well Woman       No LMP recorded. Patient is postmenopausal.  Problems:  c/o of having severe vaginal itching for 2 years. She said it feels like \"razor blades\". She uses estrogen vaginal cream on and off that she got through her PCP but states it causes her to itch even more. Birth Control: post menopausal status. Last Pap: normal obtained 5 year(s) ago. She does not have a history of BRAYDEN 2, 3 or cervical cancer. Last Mammogram:  she states she had a mammogram a few months ago at [facility] . It was normal.        1. Have you been to the ER, urgent care clinic, or hospitalized since your last visit? No    2. Have you seen or consulted any other health care providers outside of the [address] [place] [address] since your last visit?  No    Examination chaperoned by Angle Hu MA.

## 2023-01-09 NOTE — PROGRESS NOTES
Annual exam      Kim Bhakta is a ,  67 y.o. female   No LMP recorded. Patient is postmenopausal.    She presents for her annual checkup. She is having significant vulvar discomfort and irritation and itching for the past 2 years. She has tried OTC yeast meds as well as topical estrogen without relief . Hormonal status:  She reports no perimenstrual type symptoms. She is not having vasomotor symptoms. The patient is not using any ERT. Sexual history:    She  reports being sexually active and has had partner(s) who are male. Per Nursing Note:     No LMP recorded. Patient is postmenopausal.  Problems:  c/o of having severe vaginal itching for 2 years. She said it feels like \"razor blades\". She uses estrogen vaginal cream on and off that she got through her PCP but states it causes her to itch even more. Birth Control: post menopausal status. Last Pap: normal obtained 5 year(s) ago. She does not have a history of BRAYDEN 2, 3 or cervical cancer. Last Mammogram:  she states she had a mammogram a few months ago at [facility] . It was normal.  Past Medical History:   Diagnosis Date    Bipolar affective ([facility] [address]     Chronic kidney disease     Hypertension     Ischemic colitis ([facility] [address] 2008    Lithium toxicity 2004    Stroke [facility])      Past Surgical History:   Procedure Laterality Date    HX ORTHOPAEDIC      back surgery     HX ORTHOPAEDIC      right foot surgery        Current Outpatient Medications   Medication Sig Dispense Refill    gabapentin (NEURONTIN) 100 mg capsule TAKE 1 CAPSULE BY MOUTH THREE TIMES A DAY AS NEEDED 90 Capsule 1    topiramate (TOPAMAX) 200 mg tablet TAKE 1 TABLET BY MOUTH EVERY DAY AT NIGHT 90 Tablet 1    oxyCODONE IR (ROXICODONE) 10 mg tab immediate release tablet Take 10 mg by mouth daily as needed (Midday breakthrough pain). dicyclomine (BENTYL) 20 mg tablet Take 20 mg by mouth four (4) times daily.   1    lamoTRIgine (LAMICTAL) 150 mg tablet Take 300 mg by mouth nightly. 1    metoprolol succinate (TOPROL-XL) 50 mg XL tablet Take 50 mg by mouth daily. 0    omeprazole (PRILOSEC) 20 mg capsule Take 20 mg by mouth daily. 6    QUEtiapine (SEROQUEL) 200 mg tablet Take 400 mg by mouth nightly.      ergocalciferol (ERGOCALCIFEROL) 50,000 unit capsule Take 50,000 Units by mouth every Monday. amLODIPine-benazepril (LOTREL) 5-10 mg per capsule Take 1 Cap by mouth daily. denosumab (Prolia) 60 mg/mL injection 60 mg by SubCUTAneous route. (Patient not taking: Reported on 9/12/2022)      acetaminophen (TYLENOL EXTRA STRENGTH) 500 mg tablet Take 500 mg by mouth daily. (Patient not taking: Reported on 5/27/2021)      acetaminophen (TYLENOL EXTRA STRENGTH) 500 mg tablet Take 500 mg by mouth every evening. (Patient not taking: Reported on 5/27/2021)       Allergies: Hand  [ethyl alcohol (skin cleanser)] and Lithium     Tobacco History:  reports that she has quit smoking. She has never used smokeless tobacco.  Alcohol Abuse:  reports no history of alcohol use. Drug Abuse:  reports no history of drug use.     Family Medical/Cancer History:   Family History   Problem Relation Age of Onset    Lung Disease Mother     Cancer Mother         Brain        Review of Systems - History obtained from the patient  Constitutional: negative for weight loss, fever, night sweats  HEENT: negative for hearing loss, earache, congestion, snoring, sorethroat  CV: negative for chest pain, palpitations, edema  Resp: negative for cough, shortness of breath, wheezing  GI: negative for change in bowel habits, abdominal pain, black or bloody stools  : negative for frequency, dysuria, hematuria, vaginal discharge  MSK: negative for back pain, joint pain, muscle pain  Breast: negative for breast lumps, nipple discharge, galactorrhea  Skin :negative for itching, rash, hives  Neuro: negative for dizziness, headache, confusion, weakness  Psych: negative for anxiety, depression, change in mood  Heme/lymph: negative for bleeding, bruising, pallor    Physical Exam    Visit Vitals  BP (!) 169/93   Wt 156 lb (70.8 kg)   BMI 25.96 kg/m²       Constitutional  Appearance: well-nourished, well developed, alert, in no acute distress    HENT  Head and Face: appears normal    Neck  Inspection/Palpation: normal appearance, no masses or tenderness  Lymph Nodes: no lymphadenopathy present  Thyroid: gland size normal, nontender, no nodules or masses present on palpation    Chest  Respiratory Effort: breathing unlabored  Auscultation:    Cardiovascular  Heart:   Auscultation:     Breasts  Inspection of Breasts: breasts symmetrical, no skin changes, no discharge present, nipple appearance normal, no skin retraction present  Palpation of Breasts and Axillae: no masses present on palpation, no breast tenderness  Axillary Lymph Nodes: no lymphadenopathy present    Gastrointestinal  Abdominal Examination: abdomen non-tender to palpation, normal bowel sounds, no masses present  Liver and spleen: no hepatomegaly present, spleen not palpable  Hernias: no hernias identified    Genitourinary  External Genitalia: erythema and excoriations on bilateral vulva and perineum  Vagina: normal vaginal vault without central or paravaginal defects, atrophic changes, no discharge present, no inflammatory lesions present, no masses present  Bladder: non-tender to palpation  Urethra: appears normal  Cervix: normal   Uterus: normal size, shape and consistency  Adnexa: no adnexal tenderness present, no adnexal masses present  Perineum: perineum within normal limits, no evidence of trauma, no rashes or skin lesions present  Anus: anus within normal limits, no hemorrhoids present  Inguinal Lymph Nodes: no lymphadenopathy present    Skin  General Inspection: no rash, no lesions identified    Neurologic/Psychiatric  Mental Status:  Orientation: grossly oriented to person, place and time  Mood and Affect: mood normal, affect appropriate    Assessment:  Routine gynecologic examination  Her current medical status is satisfactory with vulvitis. Plan:  Counseled re: diet, exercise, healthy lifestyle  Return for yearly wellness visits  Rec annual mammogram   Will send genital culture today. Advised to use only Dove or Basis soap.

## 2023-01-13 ENCOUNTER — TELEPHONE (OUTPATIENT)
Dept: OBGYN CLINIC | Age: 73
End: 2023-01-13

## 2023-01-13 LAB
BACTERIA SPEC CULT: NORMAL
BACTERIA SPEC CULT: NORMAL
SERVICE CMNT-IMP: NORMAL

## 2023-01-13 RX ORDER — TRIAMCINOLONE ACETONIDE 1 MG/G
OINTMENT TOPICAL 3 TIMES DAILY
Qty: 30 G | Refills: 2 | Status: SHIPPED | OUTPATIENT
Start: 2023-01-13

## 2023-01-13 RX ORDER — NYSTATIN 100000 U/G
OINTMENT TOPICAL 3 TIMES DAILY
Qty: 30 G | Refills: 2 | Status: SHIPPED | OUTPATIENT
Start: 2023-01-13

## 2023-01-13 NOTE — PROGRESS NOTES
Your vaginal culture did not show any abnormal bacteria. I am going to prescribe 2 ointments to use together 3 times a day. If it helps then you can reduce it to once a day after 2 to 3 weeks. If it does not help then you need to be seen again and have a biopsy taken from the affected areas.

## 2023-01-13 NOTE — TELEPHONE ENCOUNTER
67year old patient last seen in the office on 1/9/2023 and is calling about her recent lab results     Patient was advised about recent lab results and recommendations. Patient advised of prescriptions sent by MD to patient preferred pharmacy    Patient verbalized understanding.

## 2023-03-13 ENCOUNTER — OFFICE VISIT (OUTPATIENT)
Dept: NEUROLOGY | Age: 73
End: 2023-03-13
Payer: MEDICARE

## 2023-03-13 VITALS
BODY MASS INDEX: 25.99 KG/M2 | WEIGHT: 156 LBS | RESPIRATION RATE: 18 BRPM | OXYGEN SATURATION: 97 % | HEIGHT: 65 IN | TEMPERATURE: 97.5 F | HEART RATE: 80 BPM | DIASTOLIC BLOOD PRESSURE: 70 MMHG | SYSTOLIC BLOOD PRESSURE: 122 MMHG

## 2023-03-13 DIAGNOSIS — M79.2 NEUROPATHIC PAIN: Primary | ICD-10-CM

## 2023-03-13 DIAGNOSIS — G43.009 MIGRAINE WITHOUT AURA AND WITHOUT STATUS MIGRAINOSUS, NOT INTRACTABLE: ICD-10-CM

## 2023-03-13 PROCEDURE — G8536 NO DOC ELDER MAL SCRN: HCPCS | Performed by: SPECIALIST

## 2023-03-13 PROCEDURE — 99214 OFFICE O/P EST MOD 30 MIN: CPT | Performed by: SPECIALIST

## 2023-03-13 PROCEDURE — 3017F COLORECTAL CA SCREEN DOC REV: CPT | Performed by: SPECIALIST

## 2023-03-13 PROCEDURE — 1101F PT FALLS ASSESS-DOCD LE1/YR: CPT | Performed by: SPECIALIST

## 2023-03-13 PROCEDURE — G8427 DOCREV CUR MEDS BY ELIG CLIN: HCPCS | Performed by: SPECIALIST

## 2023-03-13 PROCEDURE — G8400 PT W/DXA NO RESULTS DOC: HCPCS | Performed by: SPECIALIST

## 2023-03-13 PROCEDURE — 1123F ACP DISCUSS/DSCN MKR DOCD: CPT | Performed by: SPECIALIST

## 2023-03-13 PROCEDURE — 3078F DIAST BP <80 MM HG: CPT | Performed by: SPECIALIST

## 2023-03-13 PROCEDURE — 3074F SYST BP LT 130 MM HG: CPT | Performed by: SPECIALIST

## 2023-03-13 PROCEDURE — 1090F PRES/ABSN URINE INCON ASSESS: CPT | Performed by: SPECIALIST

## 2023-03-13 PROCEDURE — G8417 CALC BMI ABV UP PARAM F/U: HCPCS | Performed by: SPECIALIST

## 2023-03-13 PROCEDURE — G9717 DOC PT DX DEP/BP F/U NT REQ: HCPCS | Performed by: SPECIALIST

## 2023-03-13 NOTE — PROGRESS NOTES
Neurology Consult      Subjective:      Angelica Garcias is a 67 y.o. female who comes in today in follow-up. Has migraine headaches for which she is on Topamax 200 mg and there under fairly good control. Also has neuropathic pain in the highlighted on his story is as follows. Apparently recently was at [facility] and had what sounds like an EMG and nerve conduction assessment. They apparently were surprised with the results of mine for that she may have been given a diagnosis of neuropathy? She freely offered at 1 time she drank too much alcohol and there may be more than a loose connection between that and the inferred neuropathy. Is on gabapentin 100 mg 3 times daily and she apparently has known degenerative joint and disc disease in her neck. She sports a left foot brace for previous ankle injury that I believe was treated at [address]? We will be looking for that information from [facility] and continue medicine as it is. By way of recall I think she has an element of chronic renal insufficiency and I do not have any other information along those lines. Further suggestions could obviously follow. Sees rheumatology for fibromyalgia. Revisit here 6 months. Current Outpatient Medications   Medication Sig Dispense Refill    topiramate (TOPAMAX) 200 mg tablet TAKE 1 TABLET BY MOUTH EVERY DAY AT NIGHT 90 Tablet 0    nystatin (MYCOSTATIN) 100,000 unit/gram ointment Apply  to affected area three (3) times daily. 30 g 2    triamcinolone acetonide (KENALOG) 0.1 % ointment Apply  to affected area three (3) times daily. use thin layer 30 g 2    gabapentin (NEURONTIN) 100 mg capsule TAKE 1 CAPSULE BY MOUTH THREE TIMES A DAY AS NEEDED 90 Capsule 1    dicyclomine (BENTYL) 20 mg tablet Take 20 mg by mouth four (4) times daily. 1    lamoTRIgine (LAMICTAL) 150 mg tablet Take 300 mg by mouth nightly. 1    metoprolol succinate (TOPROL-XL) 50 mg XL tablet Take 50 mg by mouth daily.   0    omeprazole (PRILOSEC) 20 mg capsule Take 20 mg by mouth daily. 6    QUEtiapine (SEROQUEL) 200 mg tablet Take 400 mg by mouth nightly.      ergocalciferol (ERGOCALCIFEROL) 50,000 unit capsule Take 50,000 Units by mouth every Monday. amLODIPine-benazepril (LOTREL) 5-10 mg per capsule Take 1 Cap by mouth daily. denosumab (Prolia) 60 mg/mL injection 60 mg by SubCUTAneous route. (Patient not taking: No sig reported)      oxyCODONE IR (ROXICODONE) 10 mg tab immediate release tablet Take 10 mg by mouth daily as needed (Midday breakthrough pain). acetaminophen (TYLENOL) 500 mg tablet Take 500 mg by mouth daily. (Patient not taking: No sig reported)      acetaminophen (TYLENOL) 500 mg tablet Take 500 mg by mouth every evening.  (Patient not taking: No sig reported)        Allergies   Allergen Reactions    Hand  [Ethyl Alcohol (Skin Cleanser)] Not Reported This Time    Lithium Other (comments)     Patient had Lithium Toxicity     Past Medical History:   Diagnosis Date    Bipolar affective ([facility] [address]     Chronic kidney disease     Hypertension     Ischemic colitis ([facility] [address] 2008    Lithium toxicity 2004    Stroke [facility]) 2004      Past Surgical History:   Procedure Laterality Date    HX ORTHOPAEDIC      back surgery 2007    HX ORTHOPAEDIC      right foot surgery 2010      Social History     Socioeconomic History    Marital status:      Spouse name: Not on file    Number of children: Not on file    Years of education: Not on file    Highest education level: Not on file   Occupational History    Not on file   Tobacco Use    Smoking status: Former    Smokeless tobacco: Never   Substance and Sexual Activity    Alcohol use: No     Alcohol/week: 0.0 standard drinks    Drug use: No    Sexual activity: Yes     Partners: Male   Other Topics Concern    Not on file   Social History Narrative    Not on file     Social Determinants of Health     Financial Resource Strain: Not on file   Food Insecurity: Not on file   Transportation Needs: Not on file   Physical Activity: Not on file   Stress: Not on file   Social Connections: Not on file   Intimate Partner Violence: Not on file   Housing Stability: Not on file      Family History   Problem Relation Age of Onset    Lung Disease Mother     Cancer Mother         Brain      Visit Vitals  /70   Pulse 80   Temp 97.5 °F (36.4 °C)   Resp 18   Ht 5' 5\" (1.651 m)   Wt 70.8 kg (156 lb)   SpO2 97%   BMI 25.96 kg/m²        Review of Systems:   A comprehensive review of systems was negative except for that written in the HPI. Neuro Exam:     Appearance: The patient is well developed, well nourished, provides a coherent history and is in no acute distress. Mental Status: Oriented to time, place and person. Mood and affect appropriate. Cranial Nerves:   Intact visual fields. Fundi are benign. GUILLE, EOM's full, no nystagmus, no ptosis. Facial sensation is normal. Corneal reflexes are intact. Facial movement is symmetric. Hearing is normal bilaterally. Palate is midline with normal sternocleidomastoid and trapezius muscles are normal. Tongue is midline. Motor:  5/5 strength in upper and lower proximal and distal muscles. Normal bulk and tone. No fasciculations. Reflexes:   Deep tendon reflexes 0-1+/4 and symmetrical.   Sensory:   Length dependent sensory changes especially below the waist  to touch, pinprick and vibration. Gait:  Normal gait. Tremor:   No tremor noted. Cerebellar:  No cerebellar signs present. Neurovascular:  Normal heart sounds and regular rhythm, peripheral pulses intact, and no carotid bruits. Assessment:   Neuropathic pain. Says she had some recent testing what sounds like an EMG and nerve conduction at the [facility]. I again putting loose ends together it could have been a diagnosis of neuropathy and they were going to send information here but I do not see it. She is going to give them a call and have it forwarded here.   Said at 1 time she drank too much alcohol and that may be the point-to-point connection that we need. Is on gabapentin 100 mg 3 times daily. Migraine headaches. Has a combination of tension type as well for a mixed headache diagnosis noted. Is on Topamax 200 mg daily. Plan:   Revisit 6 months.   Signed by :  Ade Rico MD

## 2023-03-13 NOTE — LETTER
3/13/2023    Patient: Flo Castillo   YOB: 1950   Date of Visit: 3/13/2023     JUN Crowe  [address] [place] [address]  Via Fax: [phone]    Dear JUN Crowe,      Thank you for referring Ms. Flo Castillo to [facility] for evaluation. My notes for this consultation are attached. If you have questions, please do not hesitate to call me. I look forward to following your patient along with you.       Sincerely,    Loida Miles MD

## 2023-03-13 NOTE — PROGRESS NOTES
Chief Complaint   Patient presents with    Migraine     Patient is here for migraines. She states that are horrible. She said they are starting in her neck. She is also having nerve pain in her feet. She has a pinched nerve right arm up to her neck.

## 2023-03-13 NOTE — PATIENT INSTRUCTIONS
Patient history reviewed patient examined. We will need to get information from [facility] where apparently they did an EMG and nerve conduction screening and came up with what sounds like neuropathy? She did tell me [id number] that she had a alcohol problem at 1 time and that could be a clue. I will suggest continuation of the Topamax and gabapentin at this point and does have known degenerative joint disc disease in her neck. Additionally followed by rheumatology for fibromyalgia. Revisit 6 months.

## 2023-04-14 ENCOUNTER — TELEPHONE (OUTPATIENT)
Dept: NEUROLOGY | Age: 73
End: 2023-04-14

## 2023-04-17 ENCOUNTER — TELEPHONE (OUTPATIENT)
Dept: NEUROLOGY | Age: 73
End: 2023-04-17

## 2023-04-19 ENCOUNTER — HOSPITAL ENCOUNTER (OUTPATIENT)
Dept: MRI IMAGING | Age: 73
Discharge: HOME | End: 2023-04-19
Attending: NURSE PRACTITIONER
Payer: MEDICARE

## 2023-04-19 DIAGNOSIS — M54.12 CERVICAL RADICULOPATHY: ICD-10-CM

## 2023-04-19 DIAGNOSIS — M48.02 CERVICAL STENOSIS OF SPINE: ICD-10-CM

## 2023-04-19 PROCEDURE — 72141 MRI NECK SPINE W/O DYE: CPT

## 2023-04-29 RX ORDER — OXYCODONE HYDROCHLORIDE 10 MG/1
TABLET ORAL DAILY PRN
COMMUNITY

## 2023-04-29 RX ORDER — AMLODIPINE BESYLATE AND BENAZEPRIL HYDROCHLORIDE 5; 10 MG/1; MG/1
1 CAPSULE ORAL DAILY
COMMUNITY

## 2023-04-29 RX ORDER — ERGOCALCIFEROL 1.25 MG/1
CAPSULE ORAL
COMMUNITY

## 2023-04-29 RX ORDER — GABAPENTIN 100 MG/1
CAPSULE ORAL
COMMUNITY
Start: 2022-11-04

## 2023-04-29 RX ORDER — NYSTATIN 100000 U/G
OINTMENT TOPICAL 3 TIMES DAILY
COMMUNITY
Start: 2023-01-13

## 2023-04-29 RX ORDER — LAMOTRIGINE 150 MG/1
TABLET ORAL
COMMUNITY
Start: 2015-06-30

## 2023-04-29 RX ORDER — ACETAMINOPHEN 500 MG
TABLET ORAL EVERY EVENING
COMMUNITY

## 2023-04-29 RX ORDER — QUETIAPINE FUMARATE 200 MG/1
TABLET, FILM COATED ORAL
COMMUNITY
Start: 2015-06-14

## 2023-04-29 RX ORDER — DENOSUMAB 60 MG/ML
INJECTION SUBCUTANEOUS
COMMUNITY

## 2023-04-29 RX ORDER — DICYCLOMINE HCL 20 MG
TABLET ORAL 4 TIMES DAILY
COMMUNITY
Start: 2015-06-30

## 2023-04-29 RX ORDER — METOPROLOL SUCCINATE 50 MG/1
TABLET, EXTENDED RELEASE ORAL DAILY
COMMUNITY
Start: 2015-06-15

## 2023-04-29 RX ORDER — OMEPRAZOLE 20 MG/1
CAPSULE, DELAYED RELEASE ORAL DAILY
COMMUNITY
Start: 2015-06-03

## 2023-05-04 DIAGNOSIS — M48.02 CERVICAL STENOSIS OF SPINE: ICD-10-CM

## 2023-05-04 DIAGNOSIS — M54.12 CERVICAL RADICULOPATHY: Primary | ICD-10-CM

## 2023-07-13 ENCOUNTER — OFFICE VISIT (OUTPATIENT)
Age: 73
End: 2023-07-13

## 2023-07-13 VITALS — DIASTOLIC BLOOD PRESSURE: 72 MMHG | OXYGEN SATURATION: 99 % | HEART RATE: 68 BPM | SYSTOLIC BLOOD PRESSURE: 112 MMHG

## 2023-07-13 DIAGNOSIS — M48.02 SPINAL STENOSIS, CERVICAL REGION: Primary | ICD-10-CM

## 2023-07-13 DIAGNOSIS — M79.2 NEURALGIA AND NEURITIS, UNSPECIFIED: ICD-10-CM

## 2023-07-13 RX ORDER — FUROSEMIDE 20 MG/1
20 TABLET ORAL DAILY
Qty: 30 TABLET | Refills: 0 | Status: SHIPPED | OUTPATIENT
Start: 2023-07-13

## 2023-07-13 RX ORDER — BUSPIRONE HYDROCHLORIDE 5 MG/1
5 TABLET ORAL 2 TIMES DAILY
Qty: 60 TABLET | Refills: 3 | Status: SHIPPED | OUTPATIENT
Start: 2023-07-13 | End: 2023-08-12

## 2023-07-14 NOTE — PROGRESS NOTES
Neuropathy has been worse, bad swelling  Taking ROBERT tid as scribed, helps with pains  Pt is having a lt of stress  Seen Dr. Ketan Adams and had the injections in back
Continue gabapentin for neuropathy  Continue Topamax for neuropathy  Use BuSpar for anxiety as needed right now as she is heightened and this is making all of her symptoms worse  Get back in with pain management for an epidural in the spine as this did help  Call if she wants any kind of physical therapy or direction from here            This note [facility] be [facility]able in [facility]

## 2023-08-07 RX ORDER — FUROSEMIDE 20 MG/1
TABLET ORAL
Qty: 30 TABLET | Refills: 0 | OUTPATIENT
Start: 2023-08-07

## 2023-09-25 RX ORDER — FUROSEMIDE 20 MG/1
20 TABLET ORAL DAILY
Qty: 30 TABLET | Refills: 0 | Status: SHIPPED | OUTPATIENT
Start: 2023-09-25

## 2023-10-23 RX ORDER — FUROSEMIDE 20 MG/1
20 TABLET ORAL DAILY
Qty: 30 TABLET | Refills: 0 | Status: SHIPPED | OUTPATIENT
Start: 2023-10-23

## 2023-11-13 RX ORDER — BUSPIRONE HYDROCHLORIDE 5 MG/1
5 TABLET ORAL 2 TIMES DAILY
Qty: 60 TABLET | Refills: 3 | Status: SHIPPED | OUTPATIENT
Start: 2023-11-13

## 2023-11-17 RX ORDER — FUROSEMIDE 20 MG/1
20 TABLET ORAL DAILY
Qty: 30 TABLET | Refills: 0 | OUTPATIENT
Start: 2023-11-17

## 2024-02-24 DIAGNOSIS — M79.2 NEURALGIA AND NEURITIS, UNSPECIFIED: ICD-10-CM

## 2024-02-26 RX ORDER — GABAPENTIN 100 MG/1
100 CAPSULE ORAL 3 TIMES DAILY
Qty: 270 CAPSULE | Refills: 1 | Status: SHIPPED | OUTPATIENT
Start: 2024-02-26 | End: 2024-05-26

## 2024-03-11 RX ORDER — FUROSEMIDE 20 MG/1
20 TABLET ORAL DAILY
Qty: 30 TABLET | Refills: 0 | Status: SHIPPED | OUTPATIENT
Start: 2024-03-11

## 2024-04-09 RX ORDER — FUROSEMIDE 20 MG/1
20 TABLET ORAL DAILY
Qty: 30 TABLET | Refills: 0 | Status: SHIPPED | OUTPATIENT
Start: 2024-04-09

## 2024-05-05 RX ORDER — FUROSEMIDE 20 MG/1
20 TABLET ORAL DAILY
Qty: 90 TABLET | Refills: 1 | OUTPATIENT
Start: 2024-05-05

## 2024-05-05 RX ORDER — FUROSEMIDE 20 MG/1
20 TABLET ORAL DAILY
Qty: 30 TABLET | Refills: 1 | Status: SHIPPED | OUTPATIENT
Start: 2024-05-05

## 2024-06-11 ENCOUNTER — OFFICE VISIT (OUTPATIENT)
Age: 74
End: 2024-06-11
Payer: MEDICARE

## 2024-06-11 VITALS
OXYGEN SATURATION: 98 % | SYSTOLIC BLOOD PRESSURE: 122 MMHG | RESPIRATION RATE: 16 BRPM | HEART RATE: 82 BPM | DIASTOLIC BLOOD PRESSURE: 72 MMHG | TEMPERATURE: 97.6 F

## 2024-06-11 DIAGNOSIS — G43.009 MIGRAINE WITHOUT AURA, NOT INTRACTABLE, WITHOUT STATUS MIGRAINOSUS: ICD-10-CM

## 2024-06-11 DIAGNOSIS — M79.2 NEURALGIA AND NEURITIS, UNSPECIFIED: Primary | ICD-10-CM

## 2024-06-11 DIAGNOSIS — M48.02 SPINAL STENOSIS, CERVICAL REGION: ICD-10-CM

## 2024-06-11 PROCEDURE — G8421 BMI NOT CALCULATED: HCPCS | Performed by: NURSE PRACTITIONER

## 2024-06-11 PROCEDURE — 1123F ACP DISCUSS/DSCN MKR DOCD: CPT | Performed by: NURSE PRACTITIONER

## 2024-06-11 PROCEDURE — 3078F DIAST BP <80 MM HG: CPT | Performed by: NURSE PRACTITIONER

## 2024-06-11 PROCEDURE — 3074F SYST BP LT 130 MM HG: CPT | Performed by: NURSE PRACTITIONER

## 2024-06-11 PROCEDURE — 4004F PT TOBACCO SCREEN RCVD TLK: CPT | Performed by: NURSE PRACTITIONER

## 2024-06-11 PROCEDURE — 99215 OFFICE O/P EST HI 40 MIN: CPT | Performed by: NURSE PRACTITIONER

## 2024-06-11 PROCEDURE — 3017F COLORECTAL CA SCREEN DOC REV: CPT | Performed by: NURSE PRACTITIONER

## 2024-06-11 PROCEDURE — G8427 DOCREV CUR MEDS BY ELIG CLIN: HCPCS | Performed by: NURSE PRACTITIONER

## 2024-06-11 PROCEDURE — G8400 PT W/DXA NO RESULTS DOC: HCPCS | Performed by: NURSE PRACTITIONER

## 2024-06-11 PROCEDURE — 1090F PRES/ABSN URINE INCON ASSESS: CPT | Performed by: NURSE PRACTITIONER

## 2024-06-11 RX ORDER — RIZATRIPTAN BENZOATE 10 MG/1
TABLET, ORALLY DISINTEGRATING ORAL
Qty: 9 TABLET | Refills: 4 | Status: SHIPPED | OUTPATIENT
Start: 2024-06-11

## 2024-06-11 RX ORDER — FUROSEMIDE 20 MG/1
20 TABLET ORAL DAILY
Qty: 90 TABLET | Refills: 1 | Status: SHIPPED | OUTPATIENT
Start: 2024-06-11

## 2024-06-11 ASSESSMENT — PATIENT HEALTH QUESTIONNAIRE - PHQ9
SUM OF ALL RESPONSES TO PHQ QUESTIONS 1-9: 0
SUM OF ALL RESPONSES TO PHQ QUESTIONS 1-9: 0
SUM OF ALL RESPONSES TO PHQ9 QUESTIONS 1 & 2: 0
2. FEELING DOWN, DEPRESSED OR HOPELESS: NOT AT ALL
SUM OF ALL RESPONSES TO PHQ QUESTIONS 1-9: 0
SUM OF ALL RESPONSES TO PHQ QUESTIONS 1-9: 0
1. LITTLE INTEREST OR PLEASURE IN DOING THINGS: NOT AT ALL
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
## 2024-06-19 RX ORDER — BUSPIRONE HYDROCHLORIDE 5 MG/1
5 TABLET ORAL 2 TIMES DAILY
Qty: 60 TABLET | Refills: 3 | Status: SHIPPED | OUTPATIENT
Start: 2024-06-19

## 2024-06-20 ENCOUNTER — TELEPHONE (OUTPATIENT)
Age: 74
End: 2024-06-20

## 2024-07-12 RX ORDER — BUSPIRONE HYDROCHLORIDE 5 MG/1
5 TABLET ORAL 2 TIMES DAILY
Qty: 180 TABLET | Refills: 2 | OUTPATIENT
Start: 2024-07-12

## 2024-10-03 DIAGNOSIS — M79.2 NEURALGIA AND NEURITIS, UNSPECIFIED: ICD-10-CM

## 2024-10-04 DIAGNOSIS — G43.009 MIGRAINE WITHOUT AURA, NOT INTRACTABLE, WITHOUT STATUS MIGRAINOSUS: ICD-10-CM

## 2024-10-04 RX ORDER — GABAPENTIN 100 MG/1
100 CAPSULE ORAL 3 TIMES DAILY
Qty: 270 CAPSULE | Refills: 1 | Status: SHIPPED | OUTPATIENT
Start: 2024-10-04 | End: 2025-01-02

## 2024-10-04 RX ORDER — TOPIRAMATE 200 MG/1
200 TABLET, FILM COATED ORAL NIGHTLY
Qty: 90 TABLET | Refills: 1 | Status: SHIPPED | OUTPATIENT
Start: 2024-10-04

## 2024-12-06 ENCOUNTER — TELEPHONE (OUTPATIENT)
Age: 74
End: 2024-12-06

## 2024-12-06 NOTE — TELEPHONE ENCOUNTER
Called pt to reschedule her 12/10/24 appt with Feliberto & she was very upset and wanted me to get a message to Feliberto, stating that the same thing happened to her last yr about him being out of office and getting reschedule. She also stated that she is 74 yrs old and feels that seeing Feliberto once a yr isn't a good thing. I did get her rescheduled for 6/26/24 and added her to the wait list.

## 2024-12-13 RX ORDER — FUROSEMIDE 20 MG/1
20 TABLET ORAL DAILY
Qty: 90 TABLET | Refills: 1 | Status: SHIPPED | OUTPATIENT
Start: 2024-12-13

## 2024-12-17 ENCOUNTER — OFFICE VISIT (OUTPATIENT)
Age: 74
End: 2024-12-17
Payer: MEDICARE

## 2024-12-17 VITALS
DIASTOLIC BLOOD PRESSURE: 60 MMHG | TEMPERATURE: 98.4 F | WEIGHT: 148 LBS | HEIGHT: 63 IN | SYSTOLIC BLOOD PRESSURE: 138 MMHG | OXYGEN SATURATION: 93 % | BODY MASS INDEX: 26.22 KG/M2 | HEART RATE: 76 BPM | RESPIRATION RATE: 17 BRPM

## 2024-12-17 DIAGNOSIS — H53.9 VISUAL CHANGES: ICD-10-CM

## 2024-12-17 DIAGNOSIS — R51.9 NEW ONSET OF HEADACHES AFTER AGE 50: Primary | ICD-10-CM

## 2024-12-17 PROCEDURE — G8427 DOCREV CUR MEDS BY ELIG CLIN: HCPCS | Performed by: NURSE PRACTITIONER

## 2024-12-17 PROCEDURE — 99215 OFFICE O/P EST HI 40 MIN: CPT | Performed by: NURSE PRACTITIONER

## 2024-12-17 PROCEDURE — 3075F SYST BP GE 130 - 139MM HG: CPT | Performed by: NURSE PRACTITIONER

## 2024-12-17 PROCEDURE — 1159F MED LIST DOCD IN RCRD: CPT | Performed by: NURSE PRACTITIONER

## 2024-12-17 PROCEDURE — G8400 PT W/DXA NO RESULTS DOC: HCPCS | Performed by: NURSE PRACTITIONER

## 2024-12-17 PROCEDURE — G8484 FLU IMMUNIZE NO ADMIN: HCPCS | Performed by: NURSE PRACTITIONER

## 2024-12-17 PROCEDURE — 3078F DIAST BP <80 MM HG: CPT | Performed by: NURSE PRACTITIONER

## 2024-12-17 PROCEDURE — 3017F COLORECTAL CA SCREEN DOC REV: CPT | Performed by: NURSE PRACTITIONER

## 2024-12-17 PROCEDURE — 4004F PT TOBACCO SCREEN RCVD TLK: CPT | Performed by: NURSE PRACTITIONER

## 2024-12-17 PROCEDURE — 1123F ACP DISCUSS/DSCN MKR DOCD: CPT | Performed by: NURSE PRACTITIONER

## 2024-12-17 PROCEDURE — G8419 CALC BMI OUT NRM PARAM NOF/U: HCPCS | Performed by: NURSE PRACTITIONER

## 2024-12-17 PROCEDURE — 1090F PRES/ABSN URINE INCON ASSESS: CPT | Performed by: NURSE PRACTITIONER

## 2024-12-17 RX ORDER — GABAPENTIN 300 MG/1
300 CAPSULE ORAL 3 TIMES DAILY
Qty: 90 CAPSULE | Refills: 3 | Status: SHIPPED | OUTPATIENT
Start: 2024-12-17 | End: 2025-01-16

## 2024-12-17 RX ORDER — PREDNISONE 10 MG/1
TABLET ORAL
Qty: 42 TABLET | Refills: 0 | Status: SHIPPED | OUTPATIENT
Start: 2024-12-17 | End: 2024-12-18 | Stop reason: ALTCHOICE

## 2024-12-17 ASSESSMENT — PATIENT HEALTH QUESTIONNAIRE - PHQ9
SUM OF ALL RESPONSES TO PHQ QUESTIONS 1-9: 0
1. LITTLE INTEREST OR PLEASURE IN DOING THINGS: NOT AT ALL
2. FEELING DOWN, DEPRESSED OR HOPELESS: NOT AT ALL
SUM OF ALL RESPONSES TO PHQ QUESTIONS 1-9: 0
SUM OF ALL RESPONSES TO PHQ9 QUESTIONS 1 & 2: 0

## 2024-12-18 ENCOUNTER — TELEPHONE (OUTPATIENT)
Age: 74
End: 2024-12-18

## 2024-12-18 NOTE — PROGRESS NOTES
Cindy Santana,FNP-BC----[facility]  
control for dose modulation.    FINDINGS:  There is no extra-axial fluid collection hemorrhage shift or masses.    Impression  Negative.      EEG Result:      Carotid Doppler:        Recent Labs:  Lab Results   Component Value Date    WBC 6.8 07/10/2019    HGB 8.6 (L) 07/10/2019    HCT 27.3 (L) 07/10/2019    MCV 95.1 07/10/2019     07/10/2019     Lab Results   Component Value Date     07/10/2019    K 3.8 07/10/2019     (H) 07/10/2019    CO2 23 07/10/2019    BUN 11 07/10/2019    CREATININE 1.00 07/10/2019    GLUCOSE 117 (H) 07/10/2019    CALCIUM 9.1 07/10/2019    BILITOT 0.5 07/09/2019    ALKPHOS 89 07/09/2019    AST 21 07/09/2019    ALT 18 07/09/2019    GFRAA >60 07/10/2019    AGRATIO 0.8 (L) 07/09/2019    GLOB 4.6 (H) 07/09/2019       No results found for: \"CHOL\"  No results found for: \"TRIG\"  No results found for: \"HDL\"  No components found for: \"LDLCHOLESTEROL\", \"LDLCALC\"  No results found for: \"VLDL\"  No results found for: \"CHOLHDLRATIO\"    No results found for: \"SEDRATE\"    No results found for: \"LABA1C\"  No results found for: \"EVAN\"             1. New onset of headaches after age 50  -     MRI BRAIN W WO CONTRAST; Future  -     Sedimentation Rate; Future  -     gabapentin (NEURONTIN) 300 MG capsule; Take 1 capsule by mouth 3 times daily for 30 days. Max Daily Amount: 900 mg, Disp-90 capsule, R-3Normal  2. Visual changes  -     MRI BRAIN W WO CONTRAST; Future  -     Sedimentation Rate; Future  -     gabapentin (NEURONTIN) 300 MG capsule; Take 1 capsule by mouth 3 times daily for 30 days. Max Daily Amount: 900 mg, Disp-90 capsule, R-3Normal       SED RATE for TA     MRI brain to rule out stroke, tumor, lesion, neuralgia  Gabapentin increase to 300 mg TID for neuropathy and headache maintenance.   Prednisone to break cycle apart   FU after.       This note was created using voice recognition software. Despite editing, there may be syntax errors.

## 2024-12-18 NOTE — TELEPHONE ENCOUNTER
Patient requesting a call to discuss where do she go for lab blood work.     She stated didn't know about doing lab work.

## 2025-01-02 ENCOUNTER — HOSPITAL ENCOUNTER (OUTPATIENT)
Facility: HOSPITAL | Age: 75
Discharge: HOME | End: 2025-01-02
Payer: MEDICARE

## 2025-01-02 VITALS — WEIGHT: 148 LBS | BODY MASS INDEX: 26.22 KG/M2

## 2025-01-02 DIAGNOSIS — R51.9 NEW ONSET OF HEADACHES AFTER AGE 50: ICD-10-CM

## 2025-01-02 DIAGNOSIS — H53.9 VISUAL CHANGES: ICD-10-CM

## 2025-01-02 PROCEDURE — 6360000004 HC RX CONTRAST MEDICATION: Performed by: RADIOLOGY

## 2025-01-02 PROCEDURE — A9575 INJ GADOTERATE MEGLUMI 0.1ML: HCPCS | Performed by: RADIOLOGY

## 2025-01-02 PROCEDURE — 70553 MRI BRAIN STEM W/O & W/DYE: CPT

## 2025-01-02 RX ORDER — GADOTERATE MEGLUMINE 376.9 MG/ML
13 INJECTION INTRAVENOUS
Status: COMPLETED | OUTPATIENT
Start: 2025-01-02 | End: 2025-01-02

## 2025-01-02 RX ADMIN — GADOTERATE MEGLUMINE 13 ML: 376.9 INJECTION INTRAVENOUS at 14:58

## 2025-01-17 ENCOUNTER — TELEPHONE (OUTPATIENT)
Age: 75
End: 2025-01-17

## 2025-01-17 NOTE — TELEPHONE ENCOUNTER
Pt requesting refill for RX   predniSONE (DELTASONE) 10 MG tablet stated she is out of medication.  [id number] - [place] - [address] - P [phone] - F [phone]

## 2025-01-20 RX ORDER — PREDNISONE 10 MG/1
TABLET ORAL
Qty: 42 TABLET | Refills: 0 | Status: SHIPPED | OUTPATIENT
Start: 2025-01-20

## 2025-02-14 ENCOUNTER — TELEPHONE (OUTPATIENT)
Age: 75
End: 2025-02-14

## 2025-02-14 NOTE — TELEPHONE ENCOUNTER
Patient would like MRI results & mentioned that she needs a steroid pack phoned in.    Patient is having a hard time with her child. Increased stress    Please contact.

## 2025-02-17 RX ORDER — METHYLPREDNISOLONE 4 MG/1
TABLET ORAL
Qty: 1 KIT | Refills: 0 | Status: SHIPPED | OUTPATIENT
Start: 2025-02-17 | End: 2025-02-23

## 2025-02-17 NOTE — TELEPHONE ENCOUNTER
IMPRESSION:  Normal MRI of the brain for patient age..        Trace bilateral mastoid effusions.  No intracranial mass, hemorrhage or evidence of acute infarction.      I just sent a medrol pack in for her    MRI normal, mastoid effusion is trace, nothing to worry abou t

## 2025-02-24 ENCOUNTER — TELEPHONE (OUTPATIENT)
Age: 75
End: 2025-02-24

## 2025-04-22 ENCOUNTER — OFFICE VISIT (OUTPATIENT)
Age: 75
End: 2025-04-22
Payer: MEDICARE

## 2025-04-22 VITALS
RESPIRATION RATE: 16 BRPM | DIASTOLIC BLOOD PRESSURE: 63 MMHG | HEART RATE: 76 BPM | SYSTOLIC BLOOD PRESSURE: 147 MMHG | OXYGEN SATURATION: 97 %

## 2025-04-22 DIAGNOSIS — G43.709 CHRONIC MIGRAINE W/O AURA W/O STATUS MIGRAINOSUS, NOT INTRACTABLE: ICD-10-CM

## 2025-04-22 DIAGNOSIS — R51.9 NEW ONSET OF HEADACHES AFTER AGE 50: Primary | ICD-10-CM

## 2025-04-22 DIAGNOSIS — R51.9 NEW ONSET OF HEADACHES AFTER AGE 50: ICD-10-CM

## 2025-04-22 LAB — ERYTHROCYTE [SEDIMENTATION RATE] IN BLOOD: 26 MM/HR (ref 0–30)

## 2025-04-22 PROCEDURE — 3078F DIAST BP <80 MM HG: CPT | Performed by: NURSE PRACTITIONER

## 2025-04-22 PROCEDURE — 1159F MED LIST DOCD IN RCRD: CPT | Performed by: NURSE PRACTITIONER

## 2025-04-22 PROCEDURE — 99215 OFFICE O/P EST HI 40 MIN: CPT | Performed by: NURSE PRACTITIONER

## 2025-04-22 PROCEDURE — 3077F SYST BP >= 140 MM HG: CPT | Performed by: NURSE PRACTITIONER

## 2025-04-22 PROCEDURE — 1090F PRES/ABSN URINE INCON ASSESS: CPT | Performed by: NURSE PRACTITIONER

## 2025-04-22 PROCEDURE — 3017F COLORECTAL CA SCREEN DOC REV: CPT | Performed by: NURSE PRACTITIONER

## 2025-04-22 PROCEDURE — 1123F ACP DISCUSS/DSCN MKR DOCD: CPT | Performed by: NURSE PRACTITIONER

## 2025-04-22 PROCEDURE — 4004F PT TOBACCO SCREEN RCVD TLK: CPT | Performed by: NURSE PRACTITIONER

## 2025-04-22 PROCEDURE — G8427 DOCREV CUR MEDS BY ELIG CLIN: HCPCS | Performed by: NURSE PRACTITIONER

## 2025-04-22 PROCEDURE — G8419 CALC BMI OUT NRM PARAM NOF/U: HCPCS | Performed by: NURSE PRACTITIONER

## 2025-04-22 PROCEDURE — G8400 PT W/DXA NO RESULTS DOC: HCPCS | Performed by: NURSE PRACTITIONER
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
## 2025-05-14 DIAGNOSIS — M79.2 NEURALGIA AND NEURITIS, UNSPECIFIED: ICD-10-CM

## 2025-05-15 RX ORDER — GABAPENTIN 100 MG/1
100 CAPSULE ORAL 3 TIMES DAILY
Qty: 270 CAPSULE | Refills: 1 | Status: SHIPPED | OUTPATIENT
Start: 2025-05-15 | End: 2025-08-13

## 2025-05-23 ENCOUNTER — TELEPHONE (OUTPATIENT)
Age: 75
End: 2025-05-23

## 2025-05-23 NOTE — TELEPHONE ENCOUNTER
[employer] Drug Acquisition: Buy and Bill   POS 19 Druu btx  Dx: G43.709  Insurance: [facility] A&B  Submission Type: [facility] to [phone]  \[facility] [place] [facility]\"":   CPT: 74586  UTN# [id number]  Approval Range: 25-25 (1 visit only)    Secondary:  B[employer] Drug Acquisition: Buy and Bill  POS 19 Druu Btx  Dx: G43.709   Insurance: [facility]  Submission Type: [facility]  HCPCS:   CPT: 27315  Reference #: [id number]  Approval Range: 25-25 (2 Visits)      25: PA sent    25: [facility] approval [facility][note 2], sent 2ndary PA to [employer] through [employer] portal.    25: rolf fax from t requesting dosing, completed and faxed abck to [phone]. Case# [id number].

## 2025-05-28 RX ORDER — FUROSEMIDE 20 MG/1
20 TABLET ORAL DAILY
Qty: 90 TABLET | Refills: 1 | Status: SHIPPED | OUTPATIENT
Start: 2025-05-28

## 2025-06-06 ENCOUNTER — OFFICE VISIT (OUTPATIENT)
Age: 75
End: 2025-06-06
Payer: MEDICARE

## 2025-06-06 DIAGNOSIS — G43.709 CHRONIC MIGRAINE W/O AURA W/O STATUS MIGRAINOSUS, NOT INTRACTABLE: Primary | ICD-10-CM

## 2025-06-06 PROCEDURE — 64615 CHEMODENERV MUSC MIGRAINE: CPT | Performed by: NURSE PRACTITIONER

## 2025-06-06 RX ADMIN — ONABOTULINUMTOXINA 155 UNITS: 200 INJECTION, POWDER, LYOPHILIZED, FOR SOLUTION INTRADERMAL; INTRAMUSCULAR at 15:12

## 2025-06-06 NOTE — PROGRESS NOTES
OFFICE PROCEDURE PROGRESS NOTE      Chief Complaint   Patient presents with    Botox Injection     Botox new start            Chart reviewed for the following:   Zunilda LEMUS DNP, have reviewed the History, Physical and updated the Allergic reactions for Amparo Camarena     TIME OUT performed immediately prior to start of procedure:   Zunilda LEMUS DNP, have performed the following reviews on Amparo Camarena prior to the start of the procedure:            * Patient was identified by name and date of birth   * Agreement on procedure being performed was verified  * Risks and Benefits explained to the patient  * Procedure site verified and marked as necessary  * Patient was positioned for comfort  * Consent was signed and verified     Time: 1200      Date of procedure: 6/6/2025    Procedure performed by:  Zunilda Streeter DNP    Provider assisted by: None    Patient assisted by: None    How tolerated by patient: tolerated the procedure well with no complications    Post Procedural Pain Scale: 2 - Hurts Little Bit    Comments: None        Botox Injection Note       Indication: patient has chronic recurrent migraine, has 7-10 less migraine days per month with botox injections    Procedure:   Botox concentration: 200 units in 4 ml of preservative-free normal saline.   31 sites injections, distribution as follow      Units/site  Sites Sides Subtotal    Procerus 5 1 1 5    5 1 2 10   Frontalis 5 2 2 20   Temporalis 5 4 2 40   Occipitalis 5 3 2 30   Upper cervical paraspinalis 5 2 2 20   Trapezius 5 3 2 30         200 units Botox were reconstituted, 155 units injected as above and the remainder was unavoidably wasted.     Patient tolerated procedure well.     _____________________________   ZUNLIDA GONZALEZ